# Patient Record
Sex: FEMALE | Race: WHITE | Employment: FULL TIME | URBAN - METROPOLITAN AREA
[De-identification: names, ages, dates, MRNs, and addresses within clinical notes are randomized per-mention and may not be internally consistent; named-entity substitution may affect disease eponyms.]

---

## 2021-09-30 ENCOUNTER — APPOINTMENT (OUTPATIENT)
Dept: ULTRASOUND IMAGING | Age: 26
End: 2021-09-30
Attending: EMERGENCY MEDICINE
Payer: COMMERCIAL

## 2021-09-30 ENCOUNTER — HOSPITAL ENCOUNTER (EMERGENCY)
Age: 26
Discharge: HOME | End: 2021-09-30
Attending: EMERGENCY MEDICINE
Payer: COMMERCIAL

## 2021-09-30 ENCOUNTER — APPOINTMENT (OUTPATIENT)
Dept: CT IMAGING | Age: 26
End: 2021-09-30
Attending: EMERGENCY MEDICINE
Payer: COMMERCIAL

## 2021-09-30 VITALS
OXYGEN SATURATION: 100 % | BODY MASS INDEX: 51.91 KG/M2 | HEIGHT: 63 IN | SYSTOLIC BLOOD PRESSURE: 146 MMHG | DIASTOLIC BLOOD PRESSURE: 95 MMHG | HEART RATE: 115 BPM | RESPIRATION RATE: 16 BRPM | TEMPERATURE: 98.3 F | WEIGHT: 293 LBS

## 2021-09-30 DIAGNOSIS — N83.202 CYST OF LEFT OVARY: Primary | ICD-10-CM

## 2021-09-30 DIAGNOSIS — N39.0 URINARY TRACT INFECTION WITHOUT HEMATURIA, SITE UNSPECIFIED: ICD-10-CM

## 2021-09-30 LAB
APPEARANCE UR: ABNORMAL
BACTERIA URNS QL MICRO: NEGATIVE /HPF
BILIRUB UR QL: NEGATIVE
COLOR UR: ABNORMAL
GLUCOSE UR STRIP.AUTO-MCNC: NEGATIVE MG/DL
HCG UR QL: NEGATIVE
HGB UR QL STRIP: ABNORMAL
KETONES UR QL STRIP.AUTO: NEGATIVE MG/DL
LEUKOCYTE ESTERASE UR QL STRIP.AUTO: ABNORMAL
MUCOUS THREADS URNS QL MICRO: ABNORMAL /LPF
NITRITE UR QL STRIP.AUTO: NEGATIVE
PH UR STRIP: 5 [PH] (ref 5–8)
PROT UR STRIP-MCNC: 30 MG/DL
RBC #/AREA URNS HPF: >100 /HPF (ref 0–5)
SP GR UR REFRACTOMETRY: 1.02 (ref 1–1.03)
UA: UC IF INDICATED,UAUC: ABNORMAL
UROBILINOGEN UR QL STRIP.AUTO: 0.1 EU/DL (ref 0.1–1)
WBC URNS QL MICRO: ABNORMAL /HPF (ref 0–4)

## 2021-09-30 PROCEDURE — 76856 US EXAM PELVIC COMPLETE: CPT

## 2021-09-30 PROCEDURE — 74176 CT ABD & PELVIS W/O CONTRAST: CPT

## 2021-09-30 PROCEDURE — 76830 TRANSVAGINAL US NON-OB: CPT

## 2021-09-30 PROCEDURE — 99283 EMERGENCY DEPT VISIT LOW MDM: CPT

## 2021-09-30 PROCEDURE — 81025 URINE PREGNANCY TEST: CPT

## 2021-09-30 PROCEDURE — 76857 US EXAM PELVIC LIMITED: CPT

## 2021-09-30 PROCEDURE — 87086 URINE CULTURE/COLONY COUNT: CPT

## 2021-09-30 PROCEDURE — 81001 URINALYSIS AUTO W/SCOPE: CPT

## 2021-09-30 RX ORDER — HYDROCODONE BITARTRATE AND ACETAMINOPHEN 5; 325 MG/1; MG/1
1 TABLET ORAL
Qty: 12 TABLET | Refills: 0 | Status: SHIPPED | OUTPATIENT
Start: 2021-09-30 | End: 2021-10-03

## 2021-09-30 RX ORDER — CEPHALEXIN 500 MG/1
500 CAPSULE ORAL 4 TIMES DAILY
Qty: 28 CAPSULE | Refills: 0 | Status: SHIPPED | OUTPATIENT
Start: 2021-09-30 | End: 2021-10-07

## 2021-09-30 NOTE — ED TRIAGE NOTES
\"Really bad abd pain to L side and troub le urinating which began on Friday.  States seen at [facility] who began treating for UTI but yesterday told her to stop treatment due thinks it may be ovarian cyst. States sx return last pm.

## 2021-09-30 NOTE — LETTER
[address] [facility] EMERGENCY DEPT  [place] [id number] [phone]    Work/School Note    Date: 9/30/2021    To Whom It May concern:    Israel Kumar was seen and treated today in the emergency room by the following provider(s):  Attending Provider: Erinn Staton MD.      Israel Kumar is excused from work/school on 09/30/21 and 10/01/21. She is medically clear to return to work/school on 10/2/2021.        Sincerely,        DR Estrellita Zambrano

## 2021-09-30 NOTE — DISCHARGE INSTRUCTIONS
Thank you! Thank you for allowing me to care for you in the emergency department. I sincerely hope that you are satisfied with your visit today. It is my goal to provide you with excellent care. Below you will find a list of your labs and imaging from your visit today. Should you have any questions regarding these results please do not hesitate to call the emergency department. Labs -     Recent Results (from the past 12 hour(s))   URINALYSIS W/ REFLEX CULTURE    Collection Time: 09/30/21  7:44 AM    Specimen: Urine   Result Value Ref Range    Color Yellow/Straw      Appearance Turbid (A) Clear      Specific gravity 1.016 1.003 - 1.030      pH (UA) 5.0 5.0 - 8.0      Protein 30 (A) Negative mg/dL    Glucose Negative Negative mg/dL    Ketone Negative Negative mg/dL    Bilirubin Negative Negative      Blood Large (A) Negative      Urobilinogen 0.1 0.1 - 1.0 EU/dL    Nitrites Negative Negative      Leukocyte Esterase Trace (A) Negative      UA:UC IF INDICATED Urine Culture Ordered (A) Culture not indicated by UA result      WBC 20-50 0 - 4 /hpf    RBC >100 (H) 0 - 5 /hpf    Bacteria Negative Negative /hpf    Mucus Trace /lpf   HCG URINE, QL    Collection Time: 09/30/21  8:00 AM   Result Value Ref Range    HCG urine, QL Negative Negative         Radiologic Studies -   CT ABD PELV WO CONT   Final Result   No bowel obstruction. No CT evidence for appendicitis or   diverticulitis. No hydronephrosis. No calcified renal or bladder stone. See recent pelvic ultrasound report. US TRANSVAGINAL   Final Result   1. Left adnexal mass likely arising from the left ovary or fallopian tube. Recommend six-week follow-up pelvic ultrasound to ensure resolution, and no   underlying neoplastic process. US PELV NON OBS   Final Result   1. Left adnexal mass likely arising from the left ovary or fallopian tube.    Recommend six-week follow-up pelvic ultrasound to ensure resolution, and no   underlying neoplastic process. CT Results  (Last 48 hours)                 09/30/21 1040  CT ABD PELV WO CONT Final result    Impression:  No bowel obstruction. No CT evidence for appendicitis or   diverticulitis. No hydronephrosis. No calcified renal or bladder stone. See recent pelvic ultrasound report. Narrative:  CT abdomen and pelvis without IV contrast.       Axial images are reviewed along with reformatted sagittal/coronal images. No IV   contrast administered. Dose reduction: All CT scans at this facility are performed using dose reduction   optimization techniques as appropriate to a performed exam including the   following-   automated exposure control, adjustments of mA and/or Kv according to patient   size, or use of iterative reconstructive technique. Lung bases are clear. Nonenhanced images demonstrate fatty change through the liver. Gallbladder   nondistended. Normal volume spleen. Pancreas and bilateral adrenal glands appear   unremarkable. Each kidney appears unremarkable on this nonenhanced study. No   calcific focus either kidney to suggest stone. No hydronephrosis. Stomach and small bowel loops are decompressed. Appendix unremarkable. There is   stool and air through colon. No CT evidence for appendicitis or diverticulitis. No ascites. Normal caliber abdominal aorta. Mildly distended bladder. No calcified bladder stone. Phleboliths lower pelvis. Prominent left ovary (limited characterization on this nonenhanced study). See   recent pelvic ultrasound report. CXR Results  (Last 48 hours)      None               If you feel that you have not received excellent quality care or timely care, please ask to speak to the nurse manager. Please choose us in the future for your continued health care needs.    ------------------------------------------------------------------------------------------------------------  The exam and treatment you received in the Emergency Department were for an urgent problem and are not intended as complete care. It is important that you follow-up with a doctor, nurse practitioner, or physician assistant to:  (1) confirm your diagnosis,  (2) re-evaluation of changes in your illness and treatment, and  (3) for ongoing care. If your symptoms become worse or you do not improve as expected and you are unable to reach your usual health care provider, you should return to the Emergency Department. We are available 24 hours a day. Please take your discharge instructions with you when you go to your follow-up appointment. If you have any problem arranging a follow-up appointment, contact the Emergency Department immediately. If a prescription has been provided, please have it filled as soon as possible to prevent a delay in treatment. Read the entire medication instruction sheet provided to you by the pharmacy. If you have any questions or reservations about taking the medication due to side effects or interactions with other medications, please call your primary care physician or contact the ER to speak with the charge nurse. Make an appointment with your family doctor or the physician you were referred to for follow-up of this visit as instructed on your discharge paperwork, as this is a mandatory follow-up. Return to the ER if you are unable to be seen or if you are unable to be seen in a timely manner. If you have any problem arranging the follow-up visit, contact the Emergency Department immediately.

## 2021-09-30 NOTE — ED PROVIDER NOTES
EMERGENCY DEPARTMENT HISTORY AND PHYSICAL EXAM      Date: 9/30/2021  Patient Name: Kerry Irby    History of Presenting Illness     Chief Complaint   Patient presents with    Abdominal Pain       History Provided By: Patient    HPI: Kerry Irby, 27 y.o. female with a past medical history significant No significant past medical history presents to the ED with chief complaint of Abdominal Pain  . 70-year-old female denies pregnancy. Patient is complaining of left lower quadrant abdominal pain. [facility] told she had a [facility]TI then told she did not. Is a culture grew out negative. She was advised to stop taking her antibiotic. Patient has had a history of ovarian cyst on the right side with subsequent oophorectomy. She is concerned that she may have a cyst on the left side it is does feel similar. Pain did improve but intensified this morning. Severe pain. Now mild pain. Left lower quadrant. No fevers. No hematuria or dysuria. No fevers. No nausea vomiting. Declining pain medicine. Pain present for 3 days. There are no other complaints, changes, or physical findings at this time. PCP: ELEN Lorenz    Current Outpatient Medications   Medication Sig Dispense Refill    cephALEXin (Keflex) 500 mg capsule Take 1 Capsule by mouth four (4) times daily for 7 days. 28 Capsule 0    HYDROcodone-acetaminophen (Norco) 5-325 mg per tablet Take 1 Tablet by mouth every six (6) hours as needed for Pain for up to 3 days. Max Daily Amount: 4 Tablets. 12 Tablet 0       Past History     Past Medical History:  Denies  Past Surgical History: oopherectomy    Family History:  Denies      Social History: denies  Social History     Tobacco Use    Smoking status: Not on file   Substance Use Topics    Alcohol use: Not on file    Drug use: Not on file       Allergies:  No Known Allergies      Review of Systems   Review of Systems   Constitutional: Negative. Negative for chills, fatigue and fever. HENT: Negative. Negative for congestion, nosebleeds and sore throat. Eyes: Negative. Negative for pain, discharge and visual disturbance. Respiratory: Negative. Negative for cough, chest tightness and shortness of breath. Cardiovascular: Negative for chest pain, palpitations and leg swelling. Gastrointestinal: Positive for abdominal pain. Negative for blood in stool, constipation, diarrhea, nausea and vomiting. Endocrine: Negative. Genitourinary: Negative. Negative for difficulty urinating, dysuria, pelvic pain and vaginal bleeding. Musculoskeletal: Negative. Negative for arthralgias, back pain and myalgias. Skin: Negative. Negative for rash and wound. Allergic/Immunologic: Negative. Neurological: Negative. Negative for dizziness, syncope, weakness, numbness and headaches. Hematological: Negative. Psychiatric/Behavioral: Negative. Negative for agitation, confusion and suicidal ideas. All other systems reviewed and are negative. Physical Exam   Physical Exam  Vitals and nursing note reviewed. Exam conducted with a chaperone present. Constitutional:       Appearance: Normal appearance. She is normal weight. HENT:      Head: Normocephalic and atraumatic. Nose: Nose normal.      Mouth/Throat:      Mouth: Mucous membranes are moist.      Pharynx: Oropharynx is clear. Eyes:      Extraocular Movements: Extraocular movements intact. Conjunctiva/sclera: Conjunctivae normal.      Pupils: Pupils are equal, round, and reactive to light. Cardiovascular:      Rate and Rhythm: Normal rate and regular rhythm. Pulses: Normal pulses. Heart sounds: Normal heart sounds. Pulmonary:      Effort: Pulmonary effort is normal. No respiratory distress. Breath sounds: Normal breath sounds. Abdominal:      General: Abdomen is flat. Bowel sounds are normal. There is no distension. Palpations: Abdomen is soft. Tenderness: There is abdominal tenderness in the left lower quadrant. There is no guarding. Musculoskeletal:         General: No swelling, tenderness, deformity or signs of injury. Normal range of motion. Cervical back: Normal range of motion and neck supple. Right lower leg: No edema. Left lower leg: No edema. Skin:     General: Skin is warm and dry. Capillary Refill: Capillary refill takes less than 2 seconds. Findings: No lesion or rash. Neurological:      General: No focal deficit present. Mental Status: She is alert and oriented to person, place, and time. Mental status is at baseline. Cranial Nerves: No cranial nerve deficit. Psychiatric:         Mood and Affect: Mood normal.         Behavior: Behavior normal.         Thought Content: Thought content normal.         Judgment: Judgment normal.         Diagnostic Study Results     Labs -     Recent Results (from the past 12 hour(s))   URINALYSIS W/ REFLEX CULTURE    Collection Time: 09/30/21  7:44 AM    Specimen: Urine   Result Value Ref Range    Color Yellow/Straw      Appearance Turbid (A) Clear      Specific gravity 1.016 1.003 - 1.030      pH (UA) 5.0 5.0 - 8.0      Protein 30 (A) Negative mg/dL    Glucose Negative Negative mg/dL    Ketone Negative Negative mg/dL    Bilirubin Negative Negative      Blood Large (A) Negative      Urobilinogen 0.1 0.1 - 1.0 EU/dL    Nitrites Negative Negative      Leukocyte Esterase Trace (A) Negative      UA:UC IF INDICATED Urine Culture Ordered (A) Culture not indicated by UA result      WBC 20-50 0 - 4 /hpf    RBC >100 (H) 0 - 5 /hpf    Bacteria Negative Negative /hpf    Mucus Trace /lpf   HCG URINE, QL    Collection Time: 09/30/21  8:00 AM   Result Value Ref Range    HCG urine, QL Negative Negative         Radiologic Studies -   CT ABD PELV WO CONT   Final Result   No bowel obstruction. No CT evidence for appendicitis or   diverticulitis. No hydronephrosis.  No calcified renal or bladder stone. See recent pelvic ultrasound report. US TRANSVAGINAL   Final Result   1. Left adnexal mass likely arising from the left ovary or fallopian tube. Recommend six-week follow-up pelvic ultrasound to ensure resolution, and no   underlying neoplastic process. US PELV NON OBS   Final Result   1. Left adnexal mass likely arising from the left ovary or fallopian tube. Recommend six-week follow-up pelvic ultrasound to ensure resolution, and no   underlying neoplastic process. CT Results  (Last 48 hours)               09/30/21 1040  CT ABD PELV WO CONT Final result    Impression:  No bowel obstruction. No CT evidence for appendicitis or   diverticulitis. No hydronephrosis. No calcified renal or bladder stone. See recent pelvic ultrasound report. Narrative:  CT abdomen and pelvis without IV contrast.       Axial images are reviewed along with reformatted sagittal/coronal images. No IV   contrast administered. Dose reduction: All CT scans at this facility are performed using dose reduction   optimization techniques as appropriate to a performed exam including the   following-   automated exposure control, adjustments of mA and/or Kv according to patient   size, or use of iterative reconstructive technique. Lung bases are clear. Nonenhanced images demonstrate fatty change through the liver. Gallbladder   nondistended. Normal volume spleen. Pancreas and bilateral adrenal glands appear   unremarkable. Each kidney appears unremarkable on this nonenhanced study. No   calcific focus either kidney to suggest stone. No hydronephrosis. Stomach and small bowel loops are decompressed. Appendix unremarkable. There is   stool and air through colon. No CT evidence for appendicitis or diverticulitis. No ascites. Normal caliber abdominal aorta. Mildly distended bladder. No calcified bladder stone. Phleboliths lower pelvis. Prominent left ovary (limited characterization on this nonenhanced study). See   recent pelvic ultrasound report. CXR Results  (Last 48 hours)    None          Medical Decision Making and ED Course   I am the first provider for this patient. I reviewed the vital signs, available nursing notes, past medical history, past surgical history, family history and social history. Vital Signs-Reviewed the patient's vital signs. Patient Vitals for the past 12 hrs:   Temp Pulse Resp BP SpO2   09/30/21 0734 98.3 °F (36.8 °C) (!) 115 16 (!) 146/95 100 %       EKG interpretation:         Records Reviewed: Previous Hospital chart. EMS run report      ED Course:   Initial assessment performed. The patients presenting problems have been discussed, and they are in agreement with the care plan formulated and outlined with them. I have encouraged them to ask questions as they arise throughout their visit. Orders Placed This Encounter    CULTURE, URINE     Standing Status:   Standing     Number of Occurrences:   1    US TRANSVAGINAL     Standing Status:   Standing     Number of Occurrences:   1     Order Specific Question:   Transport     Answer:   BED [2]     Order Specific Question:   Reason for Exam     Answer:   llq pain     Order Specific Question:   Is Patient Pregnant? Answer:   Unknown    US PELV NON OBS     Standing Status:   Standing     Number of Occurrences:   1     Order Specific Question:   Transport     Answer:   BED [2]     Order Specific Question:   Reason for Exam     Answer:   LLQ pain, 1 ovary on L     Order Specific Question:   Is Patient Pregnant? Answer:   Unknown    CT ABD PELV WO CONT     Standing Status:   Standing     Number of Occurrences:   1     Order Specific Question:   Transport     Answer:   BED [2]     Order Specific Question:   Is Patient Pregnant?      Answer:   Unknown     Order Specific Question:   Reason for Exam     Answer:   llq pain     Order Specific Question:   Type of contrast.  PLEASE NOTE: IV contrast is NOT utilized with this order. Answer:   None     Order Specific Question:   Decision Support Exception     Answer:   Emergency Medical Condition (MA) [1]    URINALYSIS W/ REFLEX CULTURE     Standing Status:   Standing     Number of Occurrences:   1    HCG URINE, QL     Standing Status:   Standing     Number of Occurrences:   1    cephALEXin (Keflex) 500 mg capsule     Sig: Take 1 Capsule by mouth four (4) times daily for 7 days. Dispense:  28 Capsule     Refill:  0    HYDROcodone-acetaminophen (Norco) 5-325 mg per tablet     Sig: Take 1 Tablet by mouth every six (6) hours as needed for Pain for up to 3 days. Max Daily Amount: 4 Tablets. Dispense:  12 Tablet     Refill:  0              CONSULTANTS:  Consults      Provider Notes (Medical Decision Making):   78-year-old female with some hematuria concern for UTI hemorrhagic versus menses versus kidney stone versus ovarian cyst versus diverticulitis with left lower quadrant abdominal pain. Ultrasound CAT scan do confirm an ovarian cyst without evidence of torsion. Patient's urine is equivocal patient would prefer to try a course of antibiotics in addition to pain control and follow-up with [facility]. Vitals are stable pain controlled at discharge. Procedures                       Disposition       Emergency Department Disposition:  Discharged         DISCHARGE PLAN:    Patient is discharged home. Discharge instructions provided. Patient is stable and improved at time of disposition. Vitals are stable. 1. Cannot display discharge medications since this patient is not currently admitted.     2.   Follow-up Information     Follow up With Specialties Details Why [address] [facility], [facility] Physician Assistant   [address] [address] [address] [phone]      [facility]   [address] [place] [id number] [phone] 3.  Return to ED if worse     Pt voiced they understand they plan and do not have questions at this time        Diagnosis     Clinical Impression:   1. Cyst of left ovary    2. Urinary tract infection without hematuria, site unspecified        Attestations:    Brian Lugo MD    Please note that this dictation was completed with [employer], the computer voice recognition software. Quite often unanticipated grammatical, syntax, homophones, and other interpretive errors are inadvertently transcribed by the computer software. Please disregard these errors. Please excuse any errors that have escaped final proofreading. Thank you.

## 2021-10-01 LAB
BACTERIA SPEC CULT: NORMAL
COLONY COUNT,CNT: 5000
COLONY COUNT,CNT: NORMAL
SPECIAL REQUESTS,SREQ: NORMAL

## 2021-10-17 ENCOUNTER — HOSPITAL ENCOUNTER (EMERGENCY)
Age: 26
Discharge: HOME | End: 2021-10-17
Attending: STUDENT IN AN ORGANIZED HEALTH CARE EDUCATION/TRAINING PROGRAM
Payer: COMMERCIAL

## 2021-10-17 ENCOUNTER — APPOINTMENT (OUTPATIENT)
Dept: ULTRASOUND IMAGING | Age: 26
End: 2021-10-17
Attending: STUDENT IN AN ORGANIZED HEALTH CARE EDUCATION/TRAINING PROGRAM
Payer: COMMERCIAL

## 2021-10-17 VITALS
OXYGEN SATURATION: 98 % | TEMPERATURE: 97.9 F | SYSTOLIC BLOOD PRESSURE: 160 MMHG | RESPIRATION RATE: 18 BRPM | BODY MASS INDEX: 51.91 KG/M2 | DIASTOLIC BLOOD PRESSURE: 89 MMHG | WEIGHT: 293 LBS | HEART RATE: 95 BPM | HEIGHT: 63 IN

## 2021-10-17 DIAGNOSIS — R10.2 PELVIC PAIN: Primary | ICD-10-CM

## 2021-10-17 DIAGNOSIS — R03.0 ELEVATED BLOOD PRESSURE READING: ICD-10-CM

## 2021-10-17 LAB
ANION GAP SERPL CALC-SCNC: 8 MMOL/L (ref 5–15)
APPEARANCE UR: ABNORMAL
BACTERIA URNS QL MICRO: NEGATIVE /HPF
BASOPHILS # BLD: 0.1 K/UL (ref 0–0.1)
BASOPHILS NFR BLD: 0 % (ref 0–1)
BILIRUB UR QL: NEGATIVE
BUN SERPL-MCNC: 10 MG/DL (ref 6–20)
BUN/CREAT SERPL: 11 (ref 12–20)
CA-I BLD-MCNC: 9.4 MG/DL (ref 8.5–10.1)
CHLORIDE SERPL-SCNC: 105 MMOL/L (ref 97–108)
CO2 SERPL-SCNC: 25 MMOL/L (ref 21–32)
COLOR UR: ABNORMAL
CREAT SERPL-MCNC: 0.87 MG/DL (ref 0.55–1.02)
DIFFERENTIAL METHOD BLD: ABNORMAL
EOSINOPHIL # BLD: 0 K/UL (ref 0–0.4)
EOSINOPHIL NFR BLD: 0 % (ref 0–7)
ERYTHROCYTE [DISTWIDTH] IN BLOOD BY AUTOMATED COUNT: 16.2 % (ref 11.5–14.5)
GLUCOSE SERPL-MCNC: 101 MG/DL (ref 65–100)
GLUCOSE UR STRIP.AUTO-MCNC: NEGATIVE MG/DL
HCG SERPL QL: NEGATIVE
HCT VFR BLD AUTO: 37.6 % (ref 35–47)
HGB BLD-MCNC: 12 G/DL (ref 11.5–16)
HGB UR QL STRIP: NEGATIVE
IMM GRANULOCYTES # BLD AUTO: 0.1 K/UL (ref 0–0.04)
IMM GRANULOCYTES NFR BLD AUTO: 0 % (ref 0–0.5)
KETONES UR QL STRIP.AUTO: 5 MG/DL
KOH PREP SPEC: NORMAL
LEUKOCYTE ESTERASE UR QL STRIP.AUTO: ABNORMAL
LYMPHOCYTES # BLD: 2.4 K/UL (ref 0.8–3.5)
LYMPHOCYTES NFR BLD: 16 % (ref 12–49)
MCH RBC QN AUTO: 24 PG (ref 26–34)
MCHC RBC AUTO-ENTMCNC: 31.9 G/DL (ref 30–36.5)
MCV RBC AUTO: 75.4 FL (ref 80–99)
MONOCYTES # BLD: 0.9 K/UL (ref 0–1)
MONOCYTES NFR BLD: 6 % (ref 5–13)
MUCOUS THREADS URNS QL MICRO: ABNORMAL /LPF
NEUTS SEG # BLD: 11.5 K/UL (ref 1.8–8)
NEUTS SEG NFR BLD: 78 % (ref 32–75)
NITRITE UR QL STRIP.AUTO: NEGATIVE
NRBC # BLD: 0 K/UL (ref 0–0.01)
NRBC BLD-RTO: 0 PER 100 WBC
PH UR STRIP: 5 [PH] (ref 5–8)
PLATELET # BLD AUTO: 459 K/UL (ref 150–400)
PMV BLD AUTO: 9.8 FL (ref 8.9–12.9)
POTASSIUM SERPL-SCNC: 4.2 MMOL/L (ref 3.5–5.1)
PROT UR STRIP-MCNC: 30 MG/DL
RBC # BLD AUTO: 4.99 M/UL (ref 3.8–5.2)
RBC #/AREA URNS HPF: ABNORMAL /HPF (ref 0–5)
SODIUM SERPL-SCNC: 138 MMOL/L (ref 136–145)
SP GR UR REFRACTOMETRY: 1.02 (ref 1–1.03)
SPECIAL REQUESTS,SREQ: NORMAL
TRICHOMONAS RAPID AG, TRICR: NEGATIVE
UA: UC IF INDICATED,UAUC: ABNORMAL
UROBILINOGEN UR QL STRIP.AUTO: 0.1 EU/DL (ref 0.1–1)
WBC # BLD AUTO: 14.9 K/UL (ref 3.6–11)
WBC URNS QL MICRO: ABNORMAL /HPF (ref 0–4)

## 2021-10-17 PROCEDURE — 76856 US EXAM PELVIC COMPLETE: CPT

## 2021-10-17 PROCEDURE — 84703 CHORIONIC GONADOTROPIN ASSAY: CPT

## 2021-10-17 PROCEDURE — 36415 COLL VENOUS BLD VENIPUNCTURE: CPT

## 2021-10-17 PROCEDURE — 80048 BASIC METABOLIC PNL TOTAL CA: CPT

## 2021-10-17 PROCEDURE — 81001 URINALYSIS AUTO W/SCOPE: CPT

## 2021-10-17 PROCEDURE — 87491 CHLMYD TRACH DNA AMP PROBE: CPT

## 2021-10-17 PROCEDURE — 99283 EMERGENCY DEPT VISIT LOW MDM: CPT

## 2021-10-17 PROCEDURE — 87210 SMEAR WET MOUNT SALINE/INK: CPT

## 2021-10-17 PROCEDURE — 96374 THER/PROPH/DIAG INJ IV PUSH: CPT

## 2021-10-17 PROCEDURE — 85025 COMPLETE CBC W/AUTO DIFF WBC: CPT

## 2021-10-17 PROCEDURE — 87808 TRICHOMONAS ASSAY W/OPTIC: CPT

## 2021-10-17 PROCEDURE — 74011250636 HC RX REV CODE- 250/636: Performed by: STUDENT IN AN ORGANIZED HEALTH CARE EDUCATION/TRAINING PROGRAM

## 2021-10-17 RX ORDER — MORPHINE SULFATE 4 MG/ML
4 INJECTION INTRAVENOUS ONCE
Status: COMPLETED | OUTPATIENT
Start: 2021-10-17 | End: 2021-10-17

## 2021-10-17 RX ORDER — ACETAMINOPHEN 500 MG
1000 TABLET ORAL
Qty: 40 TABLET | Refills: 0 | Status: SHIPPED | OUTPATIENT
Start: 2021-10-17 | End: 2021-11-26

## 2021-10-17 RX ORDER — IBUPROFEN 400 MG/1
400 TABLET ORAL
Qty: 20 TABLET | Refills: 0 | Status: SHIPPED | OUTPATIENT
Start: 2021-10-17 | End: 2022-02-18 | Stop reason: ALTCHOICE

## 2021-10-17 RX ORDER — SODIUM CHLORIDE 9 MG/ML
500 INJECTION, SOLUTION INTRAVENOUS CONTINUOUS
Status: DISCONTINUED | OUTPATIENT
Start: 2021-10-17 | End: 2021-10-17 | Stop reason: HOSPADM

## 2021-10-17 RX ORDER — ONDANSETRON 4 MG/1
4 TABLET, ORALLY DISINTEGRATING ORAL
Qty: 10 TABLET | Refills: 0 | Status: SHIPPED | OUTPATIENT
Start: 2021-10-17 | End: 2022-02-18

## 2021-10-17 RX ADMIN — SODIUM CHLORIDE 500 ML: 9 INJECTION, SOLUTION INTRAVENOUS at 13:53

## 2021-10-17 RX ADMIN — MORPHINE SULFATE 4 MG: 4 INJECTION INTRAVENOUS at 13:53

## 2021-10-17 NOTE — ED NOTES
Pt verbalized understanding of discharge instructions. IV removed. Pt alert and self ambulatory on discharge.

## 2021-10-17 NOTE — ED TRIAGE NOTES
Pt recently Dx with ovarian cyst on 9/30/21. Lower abd pain since this morning with vomiting. Wants to ensure that cyst has no ruptured.

## 2021-10-17 NOTE — ED PROVIDER NOTES
EMERGENCY DEPARTMENT HISTORY AND PHYSICAL EXAM      Date: 10/17/2021  Patient Name: Harry Velasco      History of Presenting Illness     Chief Complaint   Patient presents with    Ovarian Cyst    Abdominal Pain       History Provided By: Patient    HPI: Harry Velasco, 27 y.o. female with PMH of obesity and surgically removed right ovary due to torsion presenting with acute onset of abdominal pain. Abdominal pain is in the left lower quadrant started this morning, dull, associated with nausea and vomited couple of times. Patient is worried that she has a cyst on the left ovary and afraid that she is in a lose her left ovary. Had similar episodes of pain in the past. There is no associated fever, night sweats or chills. Of note the patient is sexually active and has not been evaluated for STDs in 2 years. Currently denying vaginal bleeding and vaginal discharge. LMP 9/30/2021. There are no other complaints, changes, or physical findings at this time. PCP: ELEN May    Current Outpatient Medications   Medication Sig Dispense Refill    acetaminophen (TYLENOL) 500 mg tablet Take 2 Tablets by mouth every six (6) hours as needed for Pain for up to 40 days. 40 Tablet 0    ibuprofen (MOTRIN) 400 mg tablet Take 1 Tablet by mouth every six (6) hours as needed for Pain. 20 Tablet 0    ondansetron (Zofran ODT) 4 mg disintegrating tablet 1 Tablet by SubLINGual route every eight (8) hours as needed for Nausea or Vomiting. 10 Tablet 0       Past History     Past Medical History:  History reviewed. No pertinent past medical history. Past Surgical History:  History reviewed. No pertinent surgical history. Family History:  History reviewed. No pertinent family history.     Social History:  Social History     Tobacco Use    Smoking status: Never Smoker    Smokeless tobacco: Never Used   Substance Use Topics    Alcohol use: Not on file    Drug use: Not on file       Allergies:  No Known Allergies      Review of Systems     Review of Systems   Constitutional: Negative for appetite change and fever. HENT: Negative for rhinorrhea and trouble swallowing. Eyes: Negative for photophobia and visual disturbance. Respiratory: Negative for cough and shortness of breath. Cardiovascular: Negative for chest pain and leg swelling. Gastrointestinal: Positive for nausea and vomiting. Negative for constipation and diarrhea. Genitourinary: Positive for pelvic pain. Negative for dysuria. Musculoskeletal: Negative for back pain and gait problem. Skin: Negative for color change and rash. Neurological: Negative for dizziness and weakness. Physical Exam     Physical Exam  Vitals and nursing note reviewed. Constitutional:       General: She is not in acute distress. Appearance: Normal appearance. She is obese. She is not ill-appearing, toxic-appearing or diaphoretic. HENT:      Head: Normocephalic and atraumatic. Mouth/Throat:      Mouth: Mucous membranes are moist.      Pharynx: Oropharynx is clear. Eyes:      Extraocular Movements: Extraocular movements intact. Conjunctiva/sclera: Conjunctivae normal.   Cardiovascular:      Rate and Rhythm: Normal rate and regular rhythm. Pulmonary:      Effort: Pulmonary effort is normal.      Breath sounds: Normal breath sounds. Abdominal:      General: Abdomen is flat. Palpations: Abdomen is soft. Tenderness: There is abdominal tenderness in the suprapubic area. Musculoskeletal:         General: No tenderness or deformity. Cervical back: Normal range of motion and neck supple. Skin:     General: Skin is warm and dry. Neurological:      General: No focal deficit present. Mental Status: She is alert and oriented to person, place, and time.          Lab and Diagnostic Study Results     Labs -     Recent Results (from the past 12 hour(s))   DINORAH, OTHER SOURCES    Collection Time: 10/17/21  1:30 PM    Specimen: Vagina; Other   Result Value Ref Range    Special Requests: No Special Requests      KOH No fungal elements seen     CBC WITH AUTOMATED DIFF    Collection Time: 10/17/21  1:45 PM   Result Value Ref Range    WBC 14.9 (H) 3.6 - 11.0 K/uL    RBC 4.99 3.80 - 5.20 M/uL    HGB 12.0 11.5 - 16.0 g/dL    HCT 37.6 35.0 - 47.0 %    MCV 75.4 (L) 80.0 - 99.0 FL    MCH 24.0 (L) 26.0 - 34.0 PG    MCHC 31.9 30.0 - 36.5 g/dL    RDW 16.2 (H) 11.5 - 14.5 %    PLATELET 766 (H) 006 - 400 K/uL    MPV 9.8 8.9 - 12.9 FL    NRBC 0.0 0.0  WBC    ABSOLUTE NRBC 0.00 0.00 - 0.01 K/uL    NEUTROPHILS 78 (H) 32 - 75 %    LYMPHOCYTES 16 12 - 49 %    MONOCYTES 6 5 - 13 %    EOSINOPHILS 0 0 - 7 %    BASOPHILS 0 0 - 1 %    IMMATURE GRANULOCYTES 0 0 - 0.5 %    ABS. NEUTROPHILS 11.5 (H) 1.8 - 8.0 K/UL    ABS. LYMPHOCYTES 2.4 0.8 - 3.5 K/UL    ABS. MONOCYTES 0.9 0.0 - 1.0 K/UL    ABS. EOSINOPHILS 0.0 0.0 - 0.4 K/UL    ABS. BASOPHILS 0.1 0.0 - 0.1 K/UL    ABS. IMM.  GRANS. 0.1 (H) 0.00 - 0.04 K/UL    DF AUTOMATED     METABOLIC PANEL, BASIC    Collection Time: 10/17/21  1:45 PM   Result Value Ref Range    Sodium 138 136 - 145 mmol/L    Potassium 4.2 3.5 - 5.1 mmol/L    Chloride 105 97 - 108 mmol/L    CO2 25 21 - 32 mmol/L    Anion gap 8 5 - 15 mmol/L    Glucose 101 (H) 65 - 100 mg/dL    BUN 10 6 - 20 mg/dL    Creatinine 0.87 0.55 - 1.02 mg/dL    BUN/Creatinine ratio 11 (L) 12 - 20      GFR est AA >60 >60 ml/min/1.73m2    GFR est non-AA >60 >60 ml/min/1.73m2    Calcium 9.4 8.5 - 10.1 mg/dL   HCG QL SERUM    Collection Time: 10/17/21  1:45 PM   Result Value Ref Range    HCG, Ql. Negative Negative     URINALYSIS W/ REFLEX CULTURE    Collection Time: 10/17/21  1:45 PM    Specimen: Urine   Result Value Ref Range    Color Yellow/Straw      Appearance Turbid (A) Clear      Specific gravity 1.023 1.003 - 1.030      pH (UA) 5.0 5.0 - 8.0      Protein 30 (A) Negative mg/dL    Glucose Negative Negative mg/dL    Ketone 5 (A) Negative mg/dL    Bilirubin Negative Negative      Blood Negative Negative      Urobilinogen 0.1 0.1 - 1.0 EU/dL    Nitrites Negative Negative      Leukocyte Esterase Trace (A) Negative      UA:UC IF INDICATED Culture not indicated by UA result Culture not indicated by UA result      WBC 5-10 0 - 4 /hpf    RBC 5-10 0 - 5 /hpf    Bacteria Negative Negative /hpf    Mucus Trace /lpf   TRICHOMONAS RAPID AG    Collection Time: 10/17/21  1:52 PM   Result Value Ref Range    Trichomonas, rapid Ag Negative Negative         Radiologic Studies -   [unfilled]  CT Results  (Last 48 hours)    None        CXR Results  (Last 48 hours)    None          Medical Decision Making and ED Course   - I am the first and primary provider for this patient AND AM THE PRIMARY PROVIDER OF RECORD. - I reviewed the vital signs, available nursing notes, past medical history, past surgical history, family history and social history. - Initial assessment performed. The patients presenting problems have been discussed, and the staff are in agreement with the care plan formulated and outlined with them. I have encouraged them to ask questions as they arise throughout their visit. Vital Signs-Reviewed the patient's vital signs. Patient Vitals for the past 12 hrs:   Temp Pulse Resp BP SpO2   10/17/21 1700 -- 95 -- -- --   10/17/21 1526 -- (!) 109 18 (!) 160/89 98 %   10/17/21 1305 97.9 °F (36.6 °C) (!) 107 20 (!) 164/84 99 %         Records Reviewed: Nursing Notes and Old Medical Records    Provider Notes (Medical Decision Making):   Patient presenting with pelvic pain. Differential diagnosis includes UTI, PID, tubo-ovarian abscess, or ovarian torsion. Patient does have history of oophorectomy in the right and has currently 1 ovary in the left. Thus, will obtain CBC, chemistry, urinalysis, pregnancy test, STI screening, and transvaginal ultrasound. ED Course: The patient presents with abdominal pain of uncertain etiology.  Based on the patient's clinical picture at this time and the workup as performed above, I see no evidence for more malignant underlying processes. Analysis of patient's electrolytes reveals no clinically significant abnormalities that contribute to the patient's presenting complaint. There is no significant evidence for dehydration. There is no evidence for renal dysfunction/abnormality at this time. Urinalysis is negative for signs of infection or dehydration. Urine pregnancy test was negative. Vaginal US is negative. The possibility of more malignant occult pathology was discussed in depth with the patient. The patient understands the need to return promptly with any worsening symptoms or changes, and that no emergency department workup, no matter how extensive, can definitely exclude some more serious intra-abdominal and pelvic processes early in the disease course. After completion of workup and discussion of results and diagnoses with the patient, all the patient's questions were answered. If required, all follow up appointments and treatments were discussed and explained. The patient sounded understanding and agrees with the plan. The patient understands that at this time there is no evidence for a more malignant underlying process, but the patient also understands that early in the process of an illness, an emergency department workup can be falsely reassuring. Routine discharge counseling was given to the patient and the patient understands that worsening, changing or persistent symptoms should prompt an immediate call or follow up with their primary physician or the emergency department. The importance of appropriate follow up was also discussed with the patient. More extensive discharge instructions were given in the patient's discharge paperwork. Disposition     Disposition: Condition improved  DC- Adult Discharges: All of the diagnostic tests were reviewed and questions answered.  Diagnosis, care plan and treatment options were discussed. The patient understands the instructions and will follow up as directed. The patients results have been reviewed with them. They have been counseled regarding their diagnosis. The patient verbally convey understanding and agreement of the signs, symptoms, diagnosis, treatment and prognosis and additionally agrees to follow up as recommended with their PCP in 24 - 48 hours. They also agree with the care-plan and convey that all of their questions have been answered. I have also put together some discharge instructions for them that include: 1) educational information regarding their diagnosis, 2) how to care for their diagnosis at home, as well a 3) list of reasons why they would want to return to the ED prior to their follow-up appointment, should their condition change. Discharged      DISCHARGE PLAN:  1. There are no discharge medications for this patient. 2.   Follow-up Information     Follow up With Specialties Details Why [address] [place], [place] Physician Assistant Schedule an appointment as soon as possible for a visit in 3 days For reevaluation, Discuss your visit to the [address] [place] [address]  [phone]      [facility] EMERGENCY DEPT Emergency Medicine  As needed, If symptoms worsen [address] [facility] [place] [address] [phone]        3. Return to ED if worse   4. Discharge Medication List as of 10/17/2021  4:24 PM      START taking these medications    Details   acetaminophen (TYLENOL) 500 mg tablet Take 2 Tablets by mouth every six (6) hours as needed for Pain for up to 40 days. , Normal, Disp-40 Tablet, R-0      ibuprofen (MOTRIN) 400 mg tablet Take 1 Tablet by mouth every six (6) hours as needed for Pain., Normal, Disp-20 Tablet, R-0      ondansetron (Zofran ODT) 4 mg disintegrating tablet 1 Tablet by SubLINGual route every eight (8) hours as needed for Nausea or Vomiting., Normal, Disp-10 Tablet, R-0 Diagnosis     Clinical Impression:   1. Pelvic pain    2. Elevated blood pressure reading        Attestations: Wolfgang Wei MD    Please note that this dictation was completed with [employer], the computer voice recognition software. Quite often unanticipated grammatical, syntax, homophones, and other interpretive errors are inadvertently transcribed by the computer software. Please disregard these errors. Please excuse any errors that have escaped final proofreading. Thank you.

## 2021-10-17 NOTE — DISCHARGE INSTRUCTIONS
Thank you! Thank you for allowing me to care for you in the emergency department. I sincerely hope that you are satisfied with your visit today. It is my goal to provide you with excellent care. Below you will find a list of your labs and imaging from your visit today. Should you have any questions regarding these results please do not hesitate to call the emergency department. Labs -     Recent Results (from the past 12 hour(s))   DINORAH, OTHER SOURCES    Collection Time: 10/17/21  1:30 PM    Specimen: Vagina; Other   Result Value Ref Range    Special Requests: No Special Requests      KOH No fungal elements seen     CBC WITH AUTOMATED DIFF    Collection Time: 10/17/21  1:45 PM   Result Value Ref Range    WBC 14.9 (H) 3.6 - 11.0 K/uL    RBC 4.99 3.80 - 5.20 M/uL    HGB 12.0 11.5 - 16.0 g/dL    HCT 37.6 35.0 - 47.0 %    MCV 75.4 (L) 80.0 - 99.0 FL    MCH 24.0 (L) 26.0 - 34.0 PG    MCHC 31.9 30.0 - 36.5 g/dL    RDW 16.2 (H) 11.5 - 14.5 %    PLATELET 882 (H) 623 - 400 K/uL    MPV 9.8 8.9 - 12.9 FL    NRBC 0.0 0.0  WBC    ABSOLUTE NRBC 0.00 0.00 - 0.01 K/uL    NEUTROPHILS 78 (H) 32 - 75 %    LYMPHOCYTES 16 12 - 49 %    MONOCYTES 6 5 - 13 %    EOSINOPHILS 0 0 - 7 %    BASOPHILS 0 0 - 1 %    IMMATURE GRANULOCYTES 0 0 - 0.5 %    ABS. NEUTROPHILS 11.5 (H) 1.8 - 8.0 K/UL    ABS. LYMPHOCYTES 2.4 0.8 - 3.5 K/UL    ABS. MONOCYTES 0.9 0.0 - 1.0 K/UL    ABS. EOSINOPHILS 0.0 0.0 - 0.4 K/UL    ABS. BASOPHILS 0.1 0.0 - 0.1 K/UL    ABS. IMM.  GRANS. 0.1 (H) 0.00 - 0.04 K/UL    DF AUTOMATED     METABOLIC PANEL, BASIC    Collection Time: 10/17/21  1:45 PM   Result Value Ref Range    Sodium 138 136 - 145 mmol/L    Potassium 4.2 3.5 - 5.1 mmol/L    Chloride 105 97 - 108 mmol/L    CO2 25 21 - 32 mmol/L    Anion gap 8 5 - 15 mmol/L    Glucose 101 (H) 65 - 100 mg/dL    BUN 10 6 - 20 mg/dL    Creatinine 0.87 0.55 - 1.02 mg/dL    BUN/Creatinine ratio 11 (L) 12 - 20      GFR est AA >60 >60 ml/min/1.73m2    GFR est non-AA >60 >60 ml/min/1.73m2    Calcium 9.4 8.5 - 10.1 mg/dL   HCG QL SERUM    Collection Time: 10/17/21  1:45 PM   Result Value Ref Range    HCG, Ql. Negative Negative     URINALYSIS W/ REFLEX CULTURE    Collection Time: 10/17/21  1:45 PM    Specimen: Urine   Result Value Ref Range    Color Yellow/Straw      Appearance Turbid (A) Clear      Specific gravity 1.023 1.003 - 1.030      pH (UA) 5.0 5.0 - 8.0      Protein 30 (A) Negative mg/dL    Glucose Negative Negative mg/dL    Ketone 5 (A) Negative mg/dL    Bilirubin Negative Negative      Blood Negative Negative      Urobilinogen 0.1 0.1 - 1.0 EU/dL    Nitrites Negative Negative      Leukocyte Esterase Trace (A) Negative      UA:UC IF INDICATED Culture not indicated by UA result Culture not indicated by UA result      WBC 5-10 0 - 4 /hpf    RBC 5-10 0 - 5 /hpf    Bacteria Negative Negative /hpf    Mucus Trace /lpf   TRICHOMONAS RAPID AG    Collection Time: 10/17/21  1:52 PM   Result Value Ref Range    Trichomonas, rapid Ag Negative Negative         Radiologic Studies -   US PELV NON OBS   Final Result   1. No acute intrapelvic process. 2. The right ovary is not visualized and reportedly surgically absent. CT Results  (Last 48 hours)      None          CXR Results  (Last 48 hours)      None               If you feel that you have not received excellent quality care or timely care, please ask to speak to the nurse manager. Please choose us in the future for your continued health care needs. ------------------------------------------------------------------------------------------------------------  The exam and treatment you received in the Emergency Department were for an urgent problem and are not intended as complete care. It is important that you follow-up with a doctor, nurse practitioner, or physician assistant to:  (1) confirm your diagnosis,  (2) re-evaluation of changes in your illness and treatment, and  (3) for ongoing care.   If your symptoms become worse or you do not improve as expected and you are unable to reach your usual health care provider, you should return to the Emergency Department. We are available 24 hours a day. Please take your discharge instructions with you when you go to your follow-up appointment. If you have any problem arranging a follow-up appointment, contact the Emergency Department immediately. If a prescription has been provided, please have it filled as soon as possible to prevent a delay in treatment. Read the entire medication instruction sheet provided to you by the pharmacy. If you have any questions or reservations about taking the medication due to side effects or interactions with other medications, please call your primary care physician or contact the ER to speak with the charge nurse. Make an appointment with your family doctor or the physician you were referred to for follow-up of this visit as instructed on your discharge paperwork, as this is a mandatory follow-up. Return to the ER if you are unable to be seen or if you are unable to be seen in a timely manner. If you have any problem arranging the follow-up visit, contact the Emergency Department immediately.

## 2021-10-17 NOTE — LETTER
[address] [place] [address] EMERGENCY DEPT  Darinel Miles [address]  [phone]    Work/School Note    Date: 10/17/2021    To Whom It May concern:    Saroj Arora was seen and treated today in the emergency room by the following provider(s):  Attending Provider: Danell Osler, MD.      Saroj Arora is excused from work/school on 10/17/21 and 10/18/21. She is medically clear to return to work/school on 10/19/2021.        Sincerely,        Delio Graham MD

## 2021-10-20 LAB
C TRACH RRNA SPEC QL NAA+PROBE: NEGATIVE
N GONORRHOEA RRNA SPEC QL NAA+PROBE: NEGATIVE
PLEASE NOTE:, 188601: NORMAL
SPECIMEN SOURCE: NORMAL

## 2021-10-28 ENCOUNTER — OFFICE VISIT (OUTPATIENT)
Dept: OBGYN CLINIC | Age: 26
End: 2021-10-28
Payer: COMMERCIAL

## 2021-10-28 VITALS
HEART RATE: 116 BPM | DIASTOLIC BLOOD PRESSURE: 77 MMHG | HEIGHT: 64 IN | BODY MASS INDEX: 50.02 KG/M2 | WEIGHT: 293 LBS | OXYGEN SATURATION: 98 % | RESPIRATION RATE: 18 BRPM | SYSTOLIC BLOOD PRESSURE: 152 MMHG

## 2021-10-28 DIAGNOSIS — N76.0 VAGINITIS AND VULVOVAGINITIS: Primary | ICD-10-CM

## 2021-10-28 DIAGNOSIS — N83.202 CYST OF LEFT OVARY: ICD-10-CM

## 2021-10-28 PROCEDURE — 99203 OFFICE O/P NEW LOW 30 MIN: CPT | Performed by: OBSTETRICS & GYNECOLOGY

## 2021-10-28 RX ORDER — FLUCONAZOLE 150 MG/1
150 TABLET ORAL DAILY
Qty: 1 TABLET | Refills: 0 | Status: SHIPPED | OUTPATIENT
Start: 2021-10-28 | End: 2021-10-29

## 2021-10-28 RX ORDER — LANOLIN ALCOHOL/MO/W.PET/CERES
400 CREAM (GRAM) TOPICAL DAILY
COMMUNITY
End: 2022-02-18

## 2021-10-28 RX ORDER — IRON,CARBONYL/ASCORBIC ACID 65MG-125MG
TABLET, DELAYED RELEASE (ENTERIC COATED) ORAL
COMMUNITY
End: 2022-02-18

## 2021-10-28 NOTE — LETTER
10/28/2021    Patient: Dominic Burleson   YOB: 1995   Date of Visit: 10/28/2021     Len Vasquez, [address] [place] [address]  Via Fax: [phone]    Dear ELEN Aguilar,      Thank you for referring Ms. Aura Villeda to [place] [address] [facility] for evaluation. My notes for this consultation are attached. If you have questions, please do not hesitate to call me. I look forward to following your patient along with you.       Sincerely,    Choco White MD

## 2021-10-29 NOTE — PROGRESS NOTES
Silviano Whittaker is a 27 y.o. female, , Patient's last menstrual period was 2021 (exact date). , who presents today for the following:  Chief Complaint   Patient presents with    Gyn Exam     Pt c/o having ovarian cyst.  Pt states she feels pain on L side. No Known Allergies    Current Outpatient Medications   Medication Sig    Iron BisGl &PS [id number] mg-mg-mcg-mg cap Take  by mouth.  folic acid-vit O5-ROM E78 (FOLBEE, AV-OLIVIA, VIRT-OLIVIA) 2.5-25-1 mg tablet Take 1 Tablet by mouth daily.  folic acid 031 mcg tablet Take 400 mcg by mouth daily.  iron,carbonyl-vitamin C (Vitron-C) 65 mg iron- 125 mg TbEC Take  by mouth.  fluconazole (DIFLUCAN) 150 mg tablet Take 1 Tablet by mouth daily for 1 day. FDA advises cautious prescribing of oral fluconazole in pregnancy.  acetaminophen (TYLENOL) 500 mg tablet Take 2 Tablets by mouth every six (6) hours as needed for Pain for up to 40 days.  ibuprofen (MOTRIN) 400 mg tablet Take 1 Tablet by mouth every six (6) hours as needed for Pain.  ondansetron (Zofran ODT) 4 mg disintegrating tablet 1 Tablet by SubLINGual route every eight (8) hours as needed for Nausea or Vomiting. No current facility-administered medications for this visit. History reviewed. No pertinent past medical history. Past Surgical History:   Procedure Laterality Date    HX OOPHORECTOMY Right        History reviewed. No pertinent family history.     Social History     Socioeconomic History    Marital status: SINGLE     Spouse name: Not on file    Number of children: Not on file    Years of education: Not on file    Highest education level: Not on file   Occupational History    Not on file   Tobacco Use    Smoking status: Never Smoker    Smokeless tobacco: Never Used   Substance and Sexual Activity    Alcohol use: Never    Drug use: Never    Sexual activity: Yes     Partners: Male     Birth control/protection: Condom   Other Topics Concern    Not on file   Social History Narrative    Not on file     Social Determinants of Health     Financial Resource Strain:     Difficulty of Paying Living Expenses:    Food Insecurity:     Worried About Running Out of Food in the Last Year:     [address] in the Last Year:    Transportation Needs:     Lack of Transportation (Medical):  Lack of Transportation (Non-Medical):    Physical Activity:     Days of Exercise per Week:     Minutes of Exercise per Session:    Stress:     Feeling of Stress :    Social Connections:     Frequency of Communication with Friends and Family:     Frequency of Social Gatherings with Friends and Family:     Attends Religion Services:     Active Member of Clubs or Organizations:     Attends Club or Organization Meetings:     Marital Status:    Intimate Partner Violence:     Fear of Current or Ex-Partner:     Emotionally Abused:     Physically Abused:     Sexually Abused:          HPI        HPI  Reported by patient. Location: LLQ / pelvis  Onset/Timing: > 1 month    Quality: aching; cramping; pressure; fullness/bloating   Severity: interferes with normal activities    Associated Symptoms: no back pain; no chills; no constipation; no diarrhea; no vaginal discharge; episodes of difficulty urination; normal emptying of bladder; no feelings of urgency; no blood in the urine; normal libido; no fever; no nausea; no vomiting; no nocturia; Hx of right oophorectomy at 13 secondary to torison        Ultrasound from September 30  Uterus 7.4 x 3.7 x 3.8 cm. Normal  uterus contour and echotexture. Endometrial stripe 1.3 cm. Right ovary not  identified. Left adnexal 4.0 x 3.6 x 3.4 cm cystic and solid structure with  vascular flow. No free pelvic fluid.     IMPRESSION  1. Left adnexal mass likely arising from the left ovary or fallopian tube.   Recommend six-week follow-up pelvic ultrasound to ensure resolution, and no  underlying neoplastic process. Repeat ultrasound two week later reveal     The left ovary measures 4.7 x 3.2 x 2.7 cm. The right ovary is not visualized  and reported surgically absent. No dominant cyst is identified. There is normal  blood flow to the left ovary. There is no adnexal mass or significant free  fluid. Results reviewed and discussed with patient      Review of Systems   Constitutional: Negative. Respiratory: Negative. Cardiovascular: Negative. Gastrointestinal: Positive for abdominal pain. Genitourinary: Negative. Musculoskeletal: Negative. Skin: Negative. Neurological: Negative. Endo/Heme/Allergies: Negative. Psychiatric/Behavioral: Negative. All other systems reviewed and are negative. BP (!) 152/77 (BP 1 Location: Left upper arm, BP Patient Position: Sitting)   Pulse (!) 116   Resp 18   Ht 5' 4\" (1.626 m)   Wt 329 lb (149.2 kg)   LMP 09/30/2021 (Exact Date)   SpO2 98%   BMI 56.47 kg/m²    OBGyn Exam   PE:  Constitutional: General Appearance: healthy-appearing, well-nourished, well-developed, and well groomed. Psychiatric: Orientation: to time, place, and person. Mood and Affect: normal mood and affect and appropriate and active and alert. Abdomen: Auscultation/Inspection/Palpation: tenderness and mass and non-distended. Hernia: none palpated. Female Genitalia: Vulva: no masses, atrophy, or lesions. Bladder/Urethra: no urethral discharge or mass and normal meatus and bladder non distended. Vagina no tenderness, erythema, cystocele, rectocele, vaginal discharge      Cervix: no discharge or cervical motion tenderness and grossly normal.     Uterus: mobile, non-tender, and no uterine prolapse. Adnexa/Parametria: no adnexal tenderness. 1. Vaginitis and vulvovaginitis    - fluconazole (DIFLUCAN) 150 mg tablet; Take 1 Tablet by mouth daily for 1 day. FDA advises cautious prescribing of oral fluconazole in pregnancy. Dispense: 1 Tablet;  Refill: 0    2. Cyst of left ovary    - US PELV NON OBS; Future  Discussed with patient significance of ovarian cyst.  Management options discussed including but not limited observation, pain management, contraception. All questions answer and comprehension stated.

## 2021-11-09 DIAGNOSIS — N83.202 CYST OF LEFT OVARY: Primary | ICD-10-CM

## 2021-11-09 DIAGNOSIS — E66.01 CLASS 3 SEVERE OBESITY WITH BODY MASS INDEX (BMI) OF 50.0 TO 59.9 IN ADULT, UNSPECIFIED OBESITY TYPE, UNSPECIFIED WHETHER SERIOUS COMORBIDITY PRESENT (HCC): ICD-10-CM

## 2021-11-12 LAB
EST. AVERAGE GLUCOSE BLD GHB EST-MCNC: 114 MG/DL
FSH SERPL-ACNC: 6.3 MIU/ML
HBA1C MFR BLD: 5.6 % (ref 4.8–5.6)
LH SERPL-ACNC: 7.5 MIU/ML
PROLACTIN SERPL-MCNC: 31.7 NG/ML (ref 4.8–23.3)
TSH SERPL DL<=0.005 MIU/L-ACNC: 3.17 UIU/ML (ref 0.45–4.5)

## 2021-12-06 ENCOUNTER — PATIENT MESSAGE (OUTPATIENT)
Dept: OBGYN CLINIC | Age: 26
End: 2021-12-06

## 2021-12-06 DIAGNOSIS — R79.89 ELEVATED PROLACTIN LEVEL: Primary | ICD-10-CM

## 2021-12-06 NOTE — TELEPHONE ENCOUNTER
Spoke with the patient and advised her of her elevated prolactin level and the need for a referral to an endocrinologist.  Referral entered and Dr Haley Beltran contact information given.

## 2022-02-18 ENCOUNTER — INITIAL PRENATAL (OUTPATIENT)
Dept: OBGYN CLINIC | Age: 27
End: 2022-02-18
Payer: COMMERCIAL

## 2022-02-18 VITALS
OXYGEN SATURATION: 97 % | SYSTOLIC BLOOD PRESSURE: 157 MMHG | WEIGHT: 293 LBS | HEIGHT: 64 IN | HEART RATE: 113 BPM | BODY MASS INDEX: 50.02 KG/M2 | DIASTOLIC BLOOD PRESSURE: 103 MMHG

## 2022-02-18 DIAGNOSIS — N91.2 AMENORRHEA: Primary | ICD-10-CM

## 2022-02-18 DIAGNOSIS — Z01.419 GYNECOLOGIC EXAM NORMAL: ICD-10-CM

## 2022-02-18 DIAGNOSIS — O99.210 MATERNAL OBESITY AFFECTING PREGNANCY, ANTEPARTUM: ICD-10-CM

## 2022-02-18 DIAGNOSIS — O16.9 HYPERTENSION AFFECTING PREGNANCY, ANTEPARTUM: ICD-10-CM

## 2022-02-18 PROCEDURE — 99214 OFFICE O/P EST MOD 30 MIN: CPT | Performed by: OBSTETRICS & GYNECOLOGY

## 2022-02-18 PROCEDURE — 76801 OB US < 14 WKS SINGLE FETUS: CPT | Performed by: OBSTETRICS & GYNECOLOGY

## 2022-02-18 RX ORDER — LABETALOL 100 MG/1
100 TABLET, FILM COATED ORAL 2 TIMES DAILY
Qty: 60 TABLET | Refills: 3 | Status: SHIPPED | OUTPATIENT
Start: 2022-02-18 | End: 2022-04-01 | Stop reason: DRUGHIGH

## 2022-02-18 RX ORDER — METOCLOPRAMIDE 10 MG/1
10 TABLET ORAL
Qty: 120 TABLET | Refills: 3 | Status: SHIPPED | OUTPATIENT
Start: 2022-02-18 | End: 2022-03-20

## 2022-02-21 ENCOUNTER — OFFICE VISIT (OUTPATIENT)
Dept: ENDOCRINOLOGY | Age: 27
End: 2022-02-21
Payer: COMMERCIAL

## 2022-02-21 VITALS
SYSTOLIC BLOOD PRESSURE: 130 MMHG | WEIGHT: 293 LBS | HEART RATE: 106 BPM | DIASTOLIC BLOOD PRESSURE: 82 MMHG | OXYGEN SATURATION: 98 % | BODY MASS INDEX: 50.02 KG/M2 | HEIGHT: 64 IN | TEMPERATURE: 98.6 F | RESPIRATION RATE: 20 BRPM

## 2022-02-21 DIAGNOSIS — E22.1 HYPERPROLACTINEMIA (HCC): Primary | ICD-10-CM

## 2022-02-21 PROBLEM — O10.919 PRE-EXISTING HYPERTENSION DURING PREGNANCY: Status: ACTIVE | Noted: 2022-02-21

## 2022-02-21 PROCEDURE — 99204 OFFICE O/P NEW MOD 45 MIN: CPT | Performed by: INTERNAL MEDICINE

## 2022-02-21 RX ORDER — VITAMIN A ACETATE, BETA CAROTENE, ASCORBIC ACID, CHOLECALCIFEROL, .ALPHA.-TOCOPHEROL ACETATE, DL-, THIAMINE MONONITRATE, RIBOFLAVIN, NIACINAMIDE, PYRIDOXINE HYDROCHLORIDE, FOLIC ACID, CYANOCOBALAMIN, CALCIUM CARBONATE, FERROUS FUMARATE, ZINC OXIDE, CUPRIC OXIDE 3080; 12; 120; 400; 1; 1.84; 3; 20; 22; 920; 25; 200; 27; 10; 2 [IU]/1; UG/1; MG/1; [IU]/1; MG/1; MG/1; MG/1; MG/1; MG/1; [IU]/1; MG/1; MG/1; MG/1; MG/1; MG/1
1 TABLET, FILM COATED ORAL DAILY
COMMUNITY
End: 2022-10-07

## 2022-02-21 NOTE — PROGRESS NOTES
Watson Palmer is a 27 y.o. female, , Patient's last menstrual period was 2021., who presents today for the following:  Chief Complaint   Patient presents with    Initial Prenatal Visit     Pt c/o nausea. No Known Allergies    Current Outpatient Medications   Medication Sig    labetaloL (NORMODYNE) 100 mg tablet Take 1 Tablet by mouth two (2) times a day.  metoclopramide HCl (REGLAN) 10 mg tablet Take 1 Tablet by mouth Before breakfast, lunch, dinner and at bedtime for 30 days.  folic acid-vit U0-SDR H96 (FOLBEE, AV-OLIVIA, VIRT-OLIVIA) 2.5-25-1 mg tablet Take 1 Tablet by mouth daily. No current facility-administered medications for this visit. History reviewed. No pertinent past medical history. Past Surgical History:   Procedure Laterality Date    HX OOPHORECTOMY Right        History reviewed. No pertinent family history. Social History     Socioeconomic History    Marital status: SINGLE     Spouse name: Not on file    Number of children: Not on file    Years of education: Not on file    Highest education level: Not on file   Occupational History    Not on file   Tobacco Use    Smoking status: Never Smoker    Smokeless tobacco: Never Used   Substance and Sexual Activity    Alcohol use: Never    Drug use: Never    Sexual activity: Yes     Partners: Male     Birth control/protection: None   Other Topics Concern    Not on file   Social History Narrative    Not on file     Social Determinants of Health     Financial Resource Strain:     Difficulty of Paying Living Expenses: Not on file   Food Insecurity:     Worried About Running Out of Food in the Last Year: Not on file    Radha of Food in the Last Year: Not on file   Transportation Needs:     Lack of Transportation (Medical): Not on file    Lack of Transportation (Non-Medical):  Not on file   Physical Activity:     Days of Exercise per Week: Not on file    Minutes of Exercise per Session: Not on file Stress:     Feeling of Stress : Not on file   Social Connections:     Frequency of Communication with Friends and Family: Not on file    Frequency of Social Gatherings with Friends and Family: Not on file    Attends Advent Services: Not on file    Active Member of Clubs or Organizations: Not on file    Attends Club or Organization Meetings: Not on file    Marital Status: Not on file   Intimate Partner Violence:     Fear of Current or Ex-Partner: Not on file    Emotionally Abused: Not on file    Physically Abused: Not on file    Sexually Abused: Not on file   Housing Stability:     Unable to Pay for Housing in the Last Year: Not on file    Number of [place] in the Last Year: Not on file    Unstable Housing in the Last Year: Not on file         HPI  Patient presents for evaluation of amenorrhea and positive home pregnancy test  Doing well  No pain no bleeding since LMP  Reports nause  Currently on prenatal vitamins  Viable IUP seen on ultrasound- DWP    Review of Systems   Constitutional: Negative. Respiratory: Negative. Cardiovascular: Negative. Gastrointestinal: Positive for nausea. Genitourinary: Negative. Musculoskeletal: Negative. Skin: Negative. Neurological: Negative. Endo/Heme/Allergies: Negative. Psychiatric/Behavioral: Negative. All other systems reviewed and are negative. BP (!) 157/103 (BP 1 Location: Left upper arm, BP Patient Position: Sitting)   Pulse (!) 113   Ht 5' 4\" (1.626 m)   Wt 330 lb (149.7 kg)   LMP 12/03/2021   SpO2 97%   BMI 56.64 kg/m²    OBGyn Exam   PE:  Constitutional: General Appearance: healthy-appearing, well-nourished, well-developed, and well groomed. Psychiatric: Orientation: to time, place, and person. Mood and Affect: normal mood and affect and appropriate and active and alert. Abdomen: Auscultation/Inspection/Palpation: tenderness and mass and non-distended. Hernia: none palpated.      Female Genitalia: Vulva: no masses, atrophy, or lesions. Bladder/Urethra: no urethral discharge or mass and normal meatus and bladder non distended. Vagina no tenderness, erythema, cystocele, rectocele, abnormal vaginal discharge, or vesicle(s) or ulcers. Cervix: no discharge or cervical motion tenderness and grossly normal.     Uterus: mobile, non-tender, and no uterine prolapse. Adnexa/Parametria: no adnexal tenderness. 1. Amenorrhea    - HEP B SURFACE AG  - T PALLIDUM SCREEN W/REFLEX  - RUBELLA AB, IGG  - TYPE & SCREEN  - HIV 1/2 AG/AB, 4TH GENERATION,W RFLX CONFIRM  - CBC WITH AUTOMATED DIFF  - HEMOGLOBIN FRACTIONATION  - THYROID CASCADE PROFILE  - CMV AB, IGG  - CMV AB, IGM  - CYSTIC FIBROSIS MUTATION 97  - VITAMIN D, 25 HYDROXY  - metoclopramide HCl (REGLAN) 10 mg tablet; Take 1 Tablet by mouth Before breakfast, lunch, dinner and at bedtime for 30 days. Dispense: 120 Tablet; Refill: 3  - VITAMIN B12 & FOLATE  - HEPATITIS C AB  - AMB POC US OB < 14 WKS, 1ST GESTATION    2. Maternal obesity affecting pregnancy, antepartum    - REFERRAL TO MATERNAL FETAL MEDICINE  - HEMOGLOBIN A1C WITH EAG    3. Hypertension affecting pregnancy, antepartum    - labetaloL (NORMODYNE) 100 mg tablet; Take 1 Tablet by mouth two (2) times a day. Dispense: 60 Tablet; Refill: 3  - METABOLIC PANEL, COMPREHENSIVE  - URIC ACID; Future    4.  Gynecologic exam normal    - PAP IG, CT-NG-TV, APTIMA HPV AND [address] 49/75,79([id number])

## 2022-02-21 NOTE — PROGRESS NOTES
1. Have you been to the ER, urgent care clinic since your last visit? Hospitalized since your last visit? No    2. Have you seen or consulted any other health care providers outside of the [address] [place] [address] since your last visit? Include any pap smears or colon screening. No    Pt is 10 wks pregnant. Here for a elevated prolactin level from November.       Chief Complaint   Patient presents with   [facility] Patient     Elevated Prolactin 31.7     Visit Vitals  /82 (BP 1 Location: Right upper arm, BP Patient Position: Sitting, BP Cuff Size: Adult)   Pulse (!) 106   Temp 98.6 °F (37 °C) (Temporal)   Resp 20   Ht 5' 4\" (1.626 m)   Wt 332 lb (150.6 kg)   LMP 12/03/2021 (LMP Unknown)   SpO2 98%   BMI 56.99 kg/m²

## 2022-02-21 NOTE — PROGRESS NOTES
History and Physical    Patient: Ritesh Lin MRN: [id number]  SSN: [id number]    YOB: 1995  Age: 27 y.o. Sex: female      Subjective:      Ritesh Lin is a 27 y.o. female with past medical history of hypertension is sent to me by OB/GYN Dr. Lincoln Dai for elevated prolactin. She is currently 10 weeks pregnant. LMP: 12-3-2021  LUIS F: 9-    Patient was having ovarian cysts last year. When she went to OB/GYN, labs were done including prolactin, which came back elevated. This was in November 2021. Her last menstrual period was December 3, 2021. Currently she is 10 weeks pregnant. She tells me that prior to pregnancy her cycles were more or less regular. She did not have any breast pain or nipple discharge, no significant headaches. Does not take any nausea medications, any over-the-counter medications besides vitamin B12 and prenatal.  Does not smoke marijuana. No recreational drugs. This is her first pregnancy. No past medical history on file. Past Surgical History:   Procedure Laterality Date    HX OOPHORECTOMY Right       No family history on file. Social History     Tobacco Use    Smoking status: Never Smoker    Smokeless tobacco: Never Used   Substance Use Topics    Alcohol use: Never      Prior to Admission medications    Medication Sig Start Date End Date Taking? Authorizing Provider   prenatal vit-calcium-iron-fa (Prenatal Plus, calcium carb,) 27 mg iron- 1 mg tab Take 1 Tablet by mouth daily. Yes Provider, Historical   labetaloL (NORMODYNE) 100 mg tablet Take 1 Tablet by mouth two (2) times a day. 2/18/22  Yes Eddi Judge MD   metoclopramide HCl (REGLAN) 10 mg tablet Take 1 Tablet by mouth Before breakfast, lunch, dinner and at bedtime for 30 days. 2/18/22 3/20/22 Yes Eddi Judge MD   folic acid-vit A2-OZN L56 (FOLBEE, AV-OLIVIA, VIRT-OLIVIA) 2.5-25-1 mg tablet Take 1 Tablet by mouth daily.    Yes Provider, Historical        No Known Allergies    Review of Systems:  ROS    A comprehensive review of systems was preformed and it is negative except mentioned in HPI    Objective:     Vitals:    02/21/22 1402   BP: 130/82   Pulse: (!) 106   Resp: 20   Temp: 98.6 °F (37 °C)   TempSrc: Temporal   SpO2: 98%   Weight: 332 lb (150.6 kg)   Height: 5' 4\" (1.626 m)        Physical Exam:    Physical Exam  Vitals and nursing note reviewed. Constitutional:       Appearance: She is obese. HENT:      Head: Normocephalic and atraumatic. Cardiovascular:      Rate and Rhythm: Normal rate and regular rhythm. Pulmonary:      Effort: Pulmonary effort is normal.      Breath sounds: Normal breath sounds. Neurological:      General: No focal deficit present. Mental Status: She is alert and oriented to person, place, and time. Psychiatric:         Mood and Affect: Mood normal.         Behavior: Behavior normal.          Labs and Imaging:    Last 3 Recorded Weights in this Encounter    02/21/22 1402   Weight: 332 lb (150.6 kg)        Lab Results   Component Value Date/Time    Hemoglobin A1c 5.6 11/11/2021 02:36 PM        Assessment:     Patient Active Problem List   Diagnosis Code    Hyperprolactinemia ([facility] [address] E22.1    Pre-existing hypertension during pregnancy O10.919           Plan:     Hyperprolactinemia:  I reviewed labs and notes from the referring provider's office. 11-:  TSH normal at 3.17  Prolactin elevated at 31.7 (4.8-23. 3)    Patient is currently pregnant. Plan:  Discussed with patient that since she is pregnant, prolactin is going to be elevated at this time. Since previous prolactin elevation was only mild, we did not need any MRI at this time. Explained to her that prolactin will continue to be elevated during pregnancy as well as during breast-feeding. When she is completely done with breast-feeding, prolactin can be checked maybe 3 to 4 months later and if it is still elevated I can see her back.     Essential hypertension:  Blood pressure currently well controlled on current medications. No orders of the defined types were placed in this encounter.        Signed By: Jazzy Hill MD     February 21, 2022      Return to clinic as needed

## 2022-02-21 NOTE — PATIENT INSTRUCTIONS
Learning About Pregnancy  Your Care Instructions     Your health in the early weeks of your pregnancy is particularly important for your baby's health. Take good care of yourself. Anything you do that harms your body can also harm your baby. Make sure to go to all of your doctor appointments. Regular checkups will help keep you and your baby healthy. How can you care for yourself at home? Diet    · Eat a balanced diet. Make sure your diet includes plenty of beans, peas, and leafy green vegetables.     · Do not skip meals or go for many hours without eating. If you are nauseated, try to eat a small, healthy snack every 2 to 3 hours.     · Do not eat fish that has a high level of mercury, such as shark, swordfish, or mackerel. Do not eat more than one can of tuna each week.     · Drink plenty of fluids. If you have kidney, heart, or liver disease and have to limit fluids, talk with your doctor before you increase the amount of fluids you drink.     · Cut down on caffeine, such as coffee, tea, and cola.     · Do not drink alcohol, such as beer, wine, or hard liquor.     · Take a multivitamin that contains at least 400 micrograms (mcg) of folic acid to help prevent birth defects. Fortified cereal and whole wheat bread are good additional sources of folic acid.     · Increase the calcium in your diet. Try to drink a quart of skim milk each day. You may also take calcium supplements and choose foods such as cheese and yogurt. Lifestyle    · Make sure you go to your follow-up appointments.     · Get plenty of rest. You may be unusually tired while you are pregnant.     · Get at least 30 minutes of exercise on most days of the week. Walking is a good choice. If you have not exercised in the past, start out slowly. Take several short walks each day.     · Do not smoke. If you need help quitting, talk to your doctor about stop-smoking programs.  These can increase your chances of quitting for good.     · Do not touch cat feces or litter boxes. Also, wash your hands after you handle raw meat, and fully cook all meat before you eat it. Wear gloves when you work in the yard or garden, and wash your hands well when you are done. Cat feces, raw or undercooked meat, and contaminated dirt can cause an infection that may harm your baby or lead to a miscarriage.     · Do not use saunas or hot tubs. Raising your body temperature may harm your baby.     · Avoid chemical fumes, paint fumes, or poisons.     · Do not use illegal drugs, marijuana, or alcohol. Medicines    · Review all of your medicines with your doctor. Some of your routine medicines may need to be changed to protect your baby.     · Use acetaminophen (Tylenol) to relieve minor problems, such as a mild headache or backache or a mild fever with cold symptoms. Do not use nonsteroidal anti-inflammatory drugs (NSAIDs), such as ibuprofen (Advil, Motrin) or naproxen (Aleve), unless your doctor says it is okay.     · Do not take two or more pain medicines at the same time unless the doctor told you to. Many pain medicines have acetaminophen, which is Tylenol. Too much acetaminophen (Tylenol) can be harmful.     · Take your medicines exactly as prescribed. Call your doctor if you think you are having a problem with your medicine. To manage morning sickness    · If you feel sick when you first wake up, try eating a small snack (such as crackers) before you get out of bed. Allow some time to digest the snack, and then get out of bed slowly.     · Do not skip meals or go for long periods without eating. An empty stomach can make nausea worse.     · Eat small, frequent meals instead of three large meals each day.     · Drink plenty of fluids.     · Eat foods that are high in protein but low in fat.     · If you are taking iron supplements, ask your doctor if they are necessary.  Iron can make nausea worse.     · Avoid any smells, such as coffee, that make you feel sick.     · Get lots of rest. Morning sickness may be worse when you are tired. Follow-up care is a key part of your treatment and safety. Be sure to make and go to all appointments, and call your doctor if you are having problems. It's also a good idea to know your test results and keep a list of the medicines you take. Where can you learn more? Go to http://www.gray.com/  Enter G839 in the search box to learn more about \"Learning About Pregnancy. \"  Current as of: June 16, 2021               Content Version: 13.0  © 9262-2713 [employer], [employer]. Care instructions adapted under license by [employer] (which disclaims liability or warranty for this information). If you have questions about a medical condition or this instruction, always ask your healthcare professional. [address] any warranty or liability for your use of this information.

## 2022-02-23 LAB
C TRACH RRNA CVX QL NAA+PROBE: NEGATIVE
CYTOLOGIST CVX/VAG CYTO: NORMAL
CYTOLOGY CVX/VAG DOC CYTO: NORMAL
CYTOLOGY CVX/VAG DOC THIN PREP: NORMAL
DX ICD CODE: NORMAL
HPV I/H RISK 4 DNA CVX QL PROBE+SIG AMP: NEGATIVE
Lab: NORMAL
N GONORRHOEA RRNA CVX QL NAA+PROBE: NEGATIVE
OTHER STN SPEC: NORMAL
STAT OF ADQ CVX/VAG CYTO-IMP: NORMAL
T VAGINALIS RRNA SPEC QL NAA+PROBE: NEGATIVE

## 2022-02-28 ENCOUNTER — PATIENT MESSAGE (OUTPATIENT)
Dept: OBGYN CLINIC | Age: 27
End: 2022-02-28

## 2022-03-03 LAB
25(OH)D3+25(OH)D2 SERPL-MCNC: 4.6 NG/ML (ref 30–100)
ABO GROUP BLD: NORMAL
ALBUMIN SERPL-MCNC: 4.3 G/DL (ref 3.9–5)
ALBUMIN/GLOB SERPL: 1.7 {RATIO} (ref 1.2–2.2)
ALP SERPL-CCNC: 52 IU/L (ref 44–121)
ALT SERPL-CCNC: 12 IU/L (ref 0–32)
AST SERPL-CCNC: 15 IU/L (ref 0–40)
BASOPHILS # BLD AUTO: 0.1 X10E3/UL (ref 0–0.2)
BASOPHILS NFR BLD AUTO: 1 %
BILIRUB SERPL-MCNC: 0.4 MG/DL (ref 0–1.2)
BLD GP AB SCN SERPL QL: NEGATIVE
BUN SERPL-MCNC: 5 MG/DL (ref 6–20)
BUN/CREAT SERPL: 9 (ref 9–23)
CALCIUM SERPL-MCNC: 9.3 MG/DL (ref 8.7–10.2)
CFTR MUT ANL BLD/T: ABNORMAL
CHLORIDE SERPL-SCNC: 101 MMOL/L (ref 96–106)
CMV IGG SERPL IA-ACNC: >10 U/ML (ref 0–0.59)
CMV IGM SERPL IA-ACNC: <30 AU/ML (ref 0–29.9)
CO2 SERPL-SCNC: 18 MMOL/L (ref 20–29)
CREAT SERPL-MCNC: 0.58 MG/DL (ref 0.57–1)
EOSINOPHIL # BLD AUTO: 0.1 X10E3/UL (ref 0–0.4)
EOSINOPHIL NFR BLD AUTO: 1 %
ERYTHROCYTE [DISTWIDTH] IN BLOOD BY AUTOMATED COUNT: 16.8 % (ref 11.7–15.4)
EST. AVERAGE GLUCOSE BLD GHB EST-MCNC: 108 MG/DL
FOLATE SERPL-MCNC: >20 NG/ML
GENE DIS ANL CARRIER INTERP-IMP: ABNORMAL
GLOBULIN SER CALC-MCNC: 2.6 G/DL (ref 1.5–4.5)
GLUCOSE SERPL-MCNC: 99 MG/DL (ref 65–99)
HBA1C MFR BLD: 5.4 % (ref 4.8–5.6)
HBV SURFACE AG SERPL QL IA: NEGATIVE
HCT VFR BLD AUTO: 36.9 % (ref 34–46.6)
HCV AB S/CO SERPL IA: <0.1 S/CO RATIO (ref 0–0.9)
HGB A MFR BLD ELPH: 97.5 % (ref 96.4–98.8)
HGB A2 MFR BLD ELPH: 2.5 % (ref 1.8–3.2)
HGB BLD-MCNC: 12.2 G/DL (ref 11.1–15.9)
HGB F MFR BLD ELPH: 0 % (ref 0–2)
HGB FRACT BLD-IMP: NORMAL
HGB S MFR BLD ELPH: 0 %
HIV 1+2 AB+HIV1 P24 AG SERPL QL IA: NON REACTIVE
IMM GRANULOCYTES # BLD AUTO: 0 X10E3/UL (ref 0–0.1)
IMM GRANULOCYTES NFR BLD AUTO: 0 %
LYMPHOCYTES # BLD AUTO: 2.8 X10E3/UL (ref 0.7–3.1)
LYMPHOCYTES NFR BLD AUTO: 26 %
MCH RBC QN AUTO: 25.4 PG (ref 26.6–33)
MCHC RBC AUTO-ENTMCNC: 33.1 G/DL (ref 31.5–35.7)
MCV RBC AUTO: 77 FL (ref 79–97)
MONOCYTES # BLD AUTO: 0.7 X10E3/UL (ref 0.1–0.9)
MONOCYTES NFR BLD AUTO: 6 %
NEUTROPHILS # BLD AUTO: 7.3 X10E3/UL (ref 1.4–7)
NEUTROPHILS NFR BLD AUTO: 66 %
PLATELET # BLD AUTO: 336 X10E3/UL (ref 150–450)
POTASSIUM SERPL-SCNC: 4.1 MMOL/L (ref 3.5–5.2)
PROT SERPL-MCNC: 6.9 G/DL (ref 6–8.5)
RBC # BLD AUTO: 4.81 X10E6/UL (ref 3.77–5.28)
RH BLD: POSITIVE
RUBV IGG SERPL IA-ACNC: <0.9 INDEX
SODIUM SERPL-SCNC: 137 MMOL/L (ref 134–144)
TREPONEMA PALLIDUM IGG+IGM AB [PRESENCE] IN SERUM OR PLASMA BY IMMUNOASSAY: NON REACTIVE
TSH SERPL DL<=0.005 MIU/L-ACNC: 2.3 UIU/ML (ref 0.45–4.5)
URATE SERPL-MCNC: 6 MG/DL (ref 2.6–6.2)
VIT B12 SERPL-MCNC: 289 PG/ML (ref 232–1245)
WBC # BLD AUTO: 11 X10E3/UL (ref 3.4–10.8)

## 2022-03-07 ENCOUNTER — TELEPHONE (OUTPATIENT)
Dept: OBGYN CLINIC | Age: 27
End: 2022-03-07

## 2022-03-07 NOTE — TELEPHONE ENCOUNTER
Patient made aware of the 1 variant for CF being detected and the need for the father of the baby to be tested. Advised her to have him see his PCP for testing.

## 2022-03-18 ENCOUNTER — ROUTINE PRENATAL (OUTPATIENT)
Dept: OBGYN CLINIC | Age: 27
End: 2022-03-18
Payer: COMMERCIAL

## 2022-03-18 VITALS
SYSTOLIC BLOOD PRESSURE: 158 MMHG | BODY MASS INDEX: 50.02 KG/M2 | OXYGEN SATURATION: 96 % | RESPIRATION RATE: 18 BRPM | HEART RATE: 111 BPM | WEIGHT: 293 LBS | HEIGHT: 64 IN | DIASTOLIC BLOOD PRESSURE: 96 MMHG

## 2022-03-18 DIAGNOSIS — O09.90 HIGH RISK PREGNANCY, ANTEPARTUM: ICD-10-CM

## 2022-03-18 DIAGNOSIS — O16.9 HYPERTENSION AFFECTING PREGNANCY, ANTEPARTUM: Primary | ICD-10-CM

## 2022-03-18 DIAGNOSIS — Z14.1 CYSTIC FIBROSIS CARRIER IN THIRD TRIMESTER, ANTEPARTUM: ICD-10-CM

## 2022-03-18 DIAGNOSIS — O99.210 MATERNAL OBESITY AFFECTING PREGNANCY, ANTEPARTUM: ICD-10-CM

## 2022-03-18 DIAGNOSIS — Z3A.13 13 WEEKS GESTATION OF PREGNANCY: ICD-10-CM

## 2022-03-18 DIAGNOSIS — O09.893 CYSTIC FIBROSIS CARRIER IN THIRD TRIMESTER, ANTEPARTUM: ICD-10-CM

## 2022-03-18 PROBLEM — O10.919 PRE-EXISTING HYPERTENSION DURING PREGNANCY: Status: ACTIVE | Noted: 2022-02-21

## 2022-03-18 PROCEDURE — 0501F PRENATAL FLOW SHEET: CPT | Performed by: OBSTETRICS & GYNECOLOGY

## 2022-03-18 RX ORDER — LABETALOL 100 MG/1
200 TABLET, FILM COATED ORAL 2 TIMES DAILY
Qty: 60 TABLET | Refills: 0 | Status: SHIPPED | OUTPATIENT
Start: 2022-03-18 | End: 2022-04-01 | Stop reason: DRUGHIGH

## 2022-03-18 NOTE — PROGRESS NOTES
SUBJECTIVE:  Rayne Khoury presents today for return prenatal visit. She complains of: no unusual complaints. Schedule to see MFM. No pain no bleeding    Patient's medical, obstetrical, social, and family histories; medications; and lab results have been reviewed. REVIEW OF SYSTEMS: A comprehensive review of systems was negative except for that written in the HPI.    13w4d    OBJECTIVE:   Visit Vitals  BP (!) 158/96 (BP 1 Location: Right lower arm, BP Patient Position: Sitting)   Pulse (!) 111   Resp 18   Ht 5' 4\" (1.626 m)   Wt 329 lb (149.2 kg)   LMP 12/03/2021 (LMP Unknown)   SpO2 96%   BMI 56.47 kg/m²      Refer to Prenatal Physical for exam findings    ASSESSMENT/PLAN:  Prenatal Education: 1st trimester topics:  Labs reviewed with patient,  Birda Salts increased to 200mg BID. Genetic screening. Start ASA at 16 week  Patient/guardian understands and agrees with the plan of care.

## 2022-03-18 NOTE — PATIENT INSTRUCTIONS
Weeks 10 to 14 of Your Pregnancy: Care Instructions  Overview     By weeks 10 to 14 of your pregnancy, the placenta has formed inside your uterus. The placenta's main job is to give your baby oxygen and nutrients through the umbilical cord. It's possible to hear your baby's heartbeat with a special ultrasound device. Your baby's organs are developing. The arms and legs can bend. This is a good time to think about testing for birth defects. There are two types of tests: screening and diagnostic. Screening tests show the chance that a baby has a certain birth defect. They can't tell you for sure that your baby has a problem. Diagnostic tests show if a baby has a certain birth defect. It's your choice whether to have these tests. You and your partner can talk to your doctor or midwife about tests for birth defects. Follow-up care is a key part of your treatment and safety. Be sure to make and go to all appointments, and call your doctor if you are having problems. It's also a good idea to know your test results and keep a list of the medicines you take. How can you care for yourself at home? Decide about tests  · You can have screening tests and diagnostic tests to check for birth defects. The decision to have a test for birth defects is personal. Think about your age, your chance of passing on a family disease, your need to know about any problems, and what you might do after you have the test results. ? Quadruple (quad) blood test. This screening test can be done between 15 and 22 weeks of pregnancy. It checks the amount of four substances in your blood. The doctor looks at these test results, along with your age and other factors, to find out the chance that your baby may have certain problems. ? Amniocentesis. This diagnostic test is used to look for chromosomal problems in the baby's cells.  It can be done between 15 and 20 weeks of pregnancy, usually around week 16.  ? Nuchal translucency test. This test uses ultrasound to measure the thickness of the area at the back of the baby's neck. An increase in the thickness can be an early sign of Down syndrome. ? Chorionic villus sampling (CVS). This is a test that looks for certain genetic problems with your baby. The same genes that are in your baby are in the placenta. A small piece of the placenta is taken out and tested. This test is done when you are 10 to 13 weeks pregnant. Ease discomfort  · Slow down and take naps when you feel tired. · If your emotions swing, talk to someone. · If your gums bleed, try a softer toothbrush. If your gums are puffy and bleed a lot, see your dentist.  · If you feel dizzy:  ? Get up slowly after sitting or lying down. ? Drink plenty of fluids. ? Eat small snacks to keep your blood sugar stable. ? Put your head between your legs as though you were tying your shoelaces. ? Lie down with your legs higher than your head. Use pillows to prop up your feet. · If you have a headache:  ? Lie down. ? Ask your partner or a good friend for a neck massage. ? Try cool cloths over your forehead or across the back of your neck. ? Use acetaminophen (Tylenol) for pain relief. Do not use nonsteroidal anti-inflammatory drugs (NSAIDs), such as ibuprofen (Advil, Motrin) or naproxen (Aleve), unless your doctor says it is okay. · If you have a nosebleed, pinch your nose gently, and hold it for a short while. To prevent nosebleeds, try massaging a small dab of petroleum jelly, such as Vaseline, in your nostrils. · If your nose is stuffed up, try saline (saltwater) nose sprays. Do not use decongestant sprays. Care for your breasts  · Wear a bra that gives you good support. · Know that changes in your breasts are normal.  ? Your breasts may get larger and more tender. Tenderness usually gets better by 12 weeks. ? Your nipples may get darker and larger, and small bumps around your nipples may show more. ?  The veins in your chest and breasts may show more. Where can you learn more? Go to http://www.gray.com/  Enter V658 in the search box to learn more about \"Weeks 10 to 14 of Your Pregnancy: Care Instructions. \"  Current as of: June 16, 2021               Content Version: 13.2  © 5552-2583 Healthwise, [employer]. Care instructions adapted under license by [employer] (which disclaims liability or warranty for this information). If you have questions about a medical condition or this instruction, always ask your healthcare professional. [place] [address] any warranty or liability for your use of this information.

## 2022-03-19 PROBLEM — E22.1 HYPERPROLACTINEMIA (HCC): Status: ACTIVE | Noted: 2022-02-21

## 2022-03-26 LAB
ABOUT THE TEST: NORMAL
FET TS 13 RISK PLAS.CFDNA QL: NEGATIVE
FETAL FRACTION: NORMAL
FETAL SEX: NORMAL
GA EST FROM CONCEPTION DATE: NORMAL D
GESTATIONAL AGE >=9W: YES
LAB DIRECTOR NAME PROVIDER: NORMAL
LAB DIRECTOR NAME PROVIDER: NORMAL
LIMITATIONS OF THE TEST: NORMAL
NEGATIVE PREDICTIVE VALUE: NORMAL
NOTE: NORMAL
PERFORMANCE CHARACTERISTICS: NORMAL
POSITIVE PREDICTIVE VALUE: NORMAL
REF LAB TEST METHOD: NORMAL
REFERENCES: NORMAL
RESULT, 451942: NEGATIVE
TEST PERFORMANCE INFO SPEC: NORMAL
TRISOMY 18 (EDWARDS SYNDROME): NEGATIVE
TRISOMY 21 (DOWN SYNDROME): NEGATIVE

## 2022-03-31 DIAGNOSIS — O16.9 HYPERTENSION AFFECTING PREGNANCY, ANTEPARTUM: ICD-10-CM

## 2022-04-01 DIAGNOSIS — O16.9 HYPERTENSION DURING PREGNANCY, ANTEPARTUM, UNSPECIFIED HYPERTENSION IN PREGNANCY TYPE: Primary | ICD-10-CM

## 2022-04-01 RX ORDER — LABETALOL 100 MG/1
TABLET, FILM COATED ORAL
Qty: 60 TABLET | Refills: 1 | Status: SHIPPED | OUTPATIENT
Start: 2022-04-01 | End: 2022-05-20

## 2022-04-05 RX ORDER — LABETALOL 100 MG/1
200 TABLET, FILM COATED ORAL 2 TIMES DAILY
Qty: 60 TABLET | Refills: 0 | Status: SHIPPED | OUTPATIENT
Start: 2022-04-05 | End: 2022-04-22

## 2022-04-22 ENCOUNTER — ROUTINE PRENATAL (OUTPATIENT)
Dept: OBGYN CLINIC | Age: 27
End: 2022-04-22
Payer: COMMERCIAL

## 2022-04-22 VITALS
DIASTOLIC BLOOD PRESSURE: 91 MMHG | SYSTOLIC BLOOD PRESSURE: 165 MMHG | HEIGHT: 64 IN | BODY MASS INDEX: 50.02 KG/M2 | WEIGHT: 293 LBS | RESPIRATION RATE: 18 BRPM | HEART RATE: 117 BPM | OXYGEN SATURATION: 98 %

## 2022-04-22 DIAGNOSIS — Z3A.18 18 WEEKS GESTATION OF PREGNANCY: ICD-10-CM

## 2022-04-22 DIAGNOSIS — O99.210 MATERNAL OBESITY AFFECTING PREGNANCY, ANTEPARTUM: ICD-10-CM

## 2022-04-22 DIAGNOSIS — O16.9 HYPERTENSION AFFECTING PREGNANCY, ANTEPARTUM: Primary | ICD-10-CM

## 2022-04-22 DIAGNOSIS — Z14.1 CYSTIC FIBROSIS CARRIER: ICD-10-CM

## 2022-04-22 PROCEDURE — 0502F SUBSEQUENT PRENATAL CARE: CPT | Performed by: OBSTETRICS & GYNECOLOGY

## 2022-04-22 RX ORDER — ASPIRIN 81 MG/1
81 TABLET ORAL DAILY
Qty: 90 TABLET | Refills: 0 | Status: SHIPPED | OUTPATIENT
Start: 2022-04-22

## 2022-04-22 RX ORDER — LABETALOL 100 MG/1
300 TABLET, FILM COATED ORAL 2 TIMES DAILY
Qty: 90 TABLET | Refills: 3 | Status: SHIPPED | OUTPATIENT
Start: 2022-04-22 | End: 2022-08-15

## 2022-04-22 RX ORDER — LABETALOL 100 MG/1
200 TABLET, FILM COATED ORAL 2 TIMES DAILY
Qty: 90 TABLET | Refills: 0 | Status: SHIPPED | OUTPATIENT
Start: 2022-04-22 | End: 2022-04-22 | Stop reason: DRUGHIGH

## 2022-04-22 NOTE — PROGRESS NOTES
Visit Vitals  BP (!) 165/91 (BP 1 Location: Left upper arm, BP Patient Position: Sitting)   Pulse (!) 117   Resp 18   Ht 5' 4\" (1.626 m)   Wt 327 lb (148.3 kg)   LMP 12/03/2021 (LMP Unknown)   SpO2 98%   BMI 56.13 kg/m²     Pt c/o abrasion on stomach.

## 2022-04-22 NOTE — PATIENT INSTRUCTIONS
Counting Your Baby's Kicks: Care Instructions  Overview     Counting your baby's kicks is one way your doctor can tell that your baby is healthy. Most women--especially in a first pregnancy--feel their baby move for the first time between 16 and 22 weeks. The movement may feel like flutters rather than kicks. Your baby may move more at certain times of the day. When you are active, you may notice less kicking than when you are resting. At your prenatal visits, your doctor will ask whether the baby is active. In your last trimester, your doctor may ask you to count the number of times you feel your baby move. Follow-up care is a key part of your treatment and safety. Be sure to make and go to all appointments, and call your doctor if you are having problems. It's also a good idea to know your test results and keep a list of the medicines you take. How do you count fetal kicks? · A common method of checking your baby's movement is to note the length of time it takes to count ten movements (such as kicks, flutters, or rolls). · Pick your baby's most active time of day to count. This may be any time from morning to evening. · If you don't feel 10 movements in an hour, have something to eat or drink and count for another hour. If you don't feel at least 10 movements in the 2-hour period, call your doctor. When should you call for help? Call your doctor now or seek immediate medical care if:    · You noticed that your baby has stopped moving or is moving much less than normal.   Watch closely for changes in your health, and be sure to contact your doctor if you have any problems. Where can you learn more? Go to http://www.gray.com/  Enter [id number] in the search box to learn more about \"Counting Your Baby's Kicks: Care Instructions. \"  Current as of: June 16, 2021               Content Version: 13.2  © 6119-0186 [employer].    Care instructions adapted under license by [employer] (which disclaims liability or warranty for this information). If you have questions about a medical condition or this instruction, always ask your healthcare professional. [address] any warranty or liability for your use of this information.

## 2022-04-22 NOTE — PROGRESS NOTES
SUBJECTIVE:  Cyn Tucker presents today for return prenatal visit. She complains of: no unusual complaints. Scheduled to see [facility]    Patient's medical, obstetrical, social, and family histories; medications; and lab results have been reviewed. REVIEW OF SYSTEMS: A comprehensive review of systems was negative except for that written in the HPI.    18w4d    OBJECTIVE:   Visit Vitals  BP (!) 165/91 (BP 1 Location: Left upper arm, BP Patient Position: Sitting)   Pulse (!) 117   Resp 18   Ht 5' 4\" (1.626 m)   Wt 327 lb (148.3 kg)   LMP 2021 (LMP Unknown)   SpO2 98%   BMI 56.13 kg/m²      Refer to Prenatal Physical for exam findings    ASSESSMENT/PLAN:  Prenatal Education: 2nd trimester topics:  fetal movement count handout given, pregnancy danger signs and signs and symptoms of  labor. Increase labetlol 300mg TID , start ASA 81 mg    Patient/guardian understands and agrees with the plan of care.

## 2022-04-27 DIAGNOSIS — O16.9 HYPERTENSION AFFECTING PREGNANCY, ANTEPARTUM: ICD-10-CM

## 2022-04-28 RX ORDER — LABETALOL 100 MG/1
200 TABLET, FILM COATED ORAL 2 TIMES DAILY
Qty: 60 TABLET | Refills: 0 | Status: SHIPPED | OUTPATIENT
Start: 2022-04-28 | End: 2022-05-20

## 2022-04-29 LAB
2ND TRIMESTER 4 SCREEN SERPL-IMP: NORMAL
AFP ADJ MOM SERPL: 1.49
AFP SERPL-MCNC: 48.3 NG/ML
AGE AT DELIVERY: 29 YR
FET TS 18 RISK FROM MAT AGE: NORMAL
FET TS 21 RISK FROM MAT AGE: 781
GA METHOD: NORMAL
GA: 18.7 WEEKS
HCG ADJ MOM SERPL: 1.48
HCG SERPL-ACNC: NORMAL MIU/ML
IDDM PATIENT QL: NO
INHIBIN A ADJ MOM SERPL: 0.85
INHIBIN A SERPL-MCNC: 89.06 PG/ML
MULTIPLE PREGNANCY: NO
NEURAL TUBE DEFECT RISK FETUS: 2809 %
SERVICE CMNT-IMP: NORMAL
TS 18 RISK FETUS: NORMAL
TS 21 RISK FETUS: NORMAL
U ESTRIOL ADJ MOM SERPL: 1.15
U ESTRIOL SERPL-MCNC: 1.57 NG/ML

## 2022-05-20 ENCOUNTER — ROUTINE PRENATAL (OUTPATIENT)
Dept: OBGYN CLINIC | Age: 27
End: 2022-05-20
Payer: COMMERCIAL

## 2022-05-20 ENCOUNTER — HOSPITAL ENCOUNTER (OUTPATIENT)
Age: 27
Discharge: HOME | End: 2022-05-20
Attending: OBSTETRICS & GYNECOLOGY | Admitting: OBSTETRICS & GYNECOLOGY
Payer: COMMERCIAL

## 2022-05-20 VITALS
HEART RATE: 113 BPM | SYSTOLIC BLOOD PRESSURE: 145 MMHG | BODY MASS INDEX: 50.02 KG/M2 | WEIGHT: 293 LBS | RESPIRATION RATE: 18 BRPM | HEIGHT: 64 IN | OXYGEN SATURATION: 99 % | DIASTOLIC BLOOD PRESSURE: 101 MMHG

## 2022-05-20 VITALS
HEIGHT: 64 IN | WEIGHT: 293 LBS | OXYGEN SATURATION: 97 % | DIASTOLIC BLOOD PRESSURE: 62 MMHG | HEART RATE: 107 BPM | BODY MASS INDEX: 50.02 KG/M2 | RESPIRATION RATE: 20 BRPM | TEMPERATURE: 99 F | SYSTOLIC BLOOD PRESSURE: 118 MMHG

## 2022-05-20 DIAGNOSIS — Z14.1 CYSTIC FIBROSIS CARRIER: ICD-10-CM

## 2022-05-20 DIAGNOSIS — O16.9 HYPERTENSION AFFECTING PREGNANCY, ANTEPARTUM: Primary | ICD-10-CM

## 2022-05-20 DIAGNOSIS — Z3A.22 22 WEEKS GESTATION OF PREGNANCY: ICD-10-CM

## 2022-05-20 DIAGNOSIS — O99.210 MATERNAL OBESITY AFFECTING PREGNANCY, ANTEPARTUM: ICD-10-CM

## 2022-05-20 PROBLEM — Z34.90 PREGNANT: Status: ACTIVE | Noted: 2022-05-20

## 2022-05-20 LAB
ALBUMIN SERPL-MCNC: 3.1 G/DL (ref 3.5–5)
ALBUMIN/GLOB SERPL: 0.8 {RATIO} (ref 1.1–2.2)
ALP SERPL-CCNC: 46 U/L (ref 45–117)
ALT SERPL-CCNC: 19 U/L (ref 12–78)
ANION GAP SERPL CALC-SCNC: 8 MMOL/L (ref 5–15)
APPEARANCE UR: ABNORMAL
AST SERPL W P-5'-P-CCNC: 11 U/L (ref 15–37)
BACTERIA URNS QL MICRO: NEGATIVE /HPF
BASOPHILS # BLD: 0 K/UL (ref 0–0.1)
BASOPHILS NFR BLD: 0 % (ref 0–1)
BILIRUB SERPL-MCNC: 0.3 MG/DL (ref 0.2–1)
BILIRUB UR QL: NEGATIVE
BUN SERPL-MCNC: 5 MG/DL (ref 6–20)
BUN/CREAT SERPL: 9 (ref 12–20)
CA-I BLD-MCNC: 9.4 MG/DL (ref 8.5–10.1)
CHLORIDE SERPL-SCNC: 106 MMOL/L (ref 97–108)
CO2 SERPL-SCNC: 22 MMOL/L (ref 21–32)
COLOR UR: ABNORMAL
CREAT SERPL-MCNC: 0.54 MG/DL (ref 0.55–1.02)
CREAT UR-MCNC: 186 MG/DL
DIFFERENTIAL METHOD BLD: ABNORMAL
EOSINOPHIL # BLD: 0.2 K/UL (ref 0–0.4)
EOSINOPHIL NFR BLD: 1 % (ref 0–7)
ERYTHROCYTE [DISTWIDTH] IN BLOOD BY AUTOMATED COUNT: 14.9 % (ref 11.5–14.5)
GLOBULIN SER CALC-MCNC: 4.1 G/DL (ref 2–4)
GLUCOSE SERPL-MCNC: 90 MG/DL (ref 65–100)
GLUCOSE UR STRIP.AUTO-MCNC: NEGATIVE MG/DL
HCT VFR BLD AUTO: 33.5 % (ref 35–47)
HGB BLD-MCNC: 10.9 G/DL (ref 11.5–16)
HGB UR QL STRIP: NEGATIVE
IMM GRANULOCYTES # BLD AUTO: 0 K/UL (ref 0–0.04)
IMM GRANULOCYTES NFR BLD AUTO: 0 % (ref 0–0.5)
KETONES UR QL STRIP.AUTO: NEGATIVE MG/DL
LEUKOCYTE ESTERASE UR QL STRIP.AUTO: ABNORMAL
LYMPHOCYTES # BLD: 2.6 K/UL (ref 0.8–3.5)
LYMPHOCYTES NFR BLD: 22 % (ref 12–49)
MCH RBC QN AUTO: 26.9 PG (ref 26–34)
MCHC RBC AUTO-ENTMCNC: 32.5 G/DL (ref 30–36.5)
MCV RBC AUTO: 82.7 FL (ref 80–99)
MONOCYTES # BLD: 0.8 K/UL (ref 0–1)
MONOCYTES NFR BLD: 7 % (ref 5–13)
MUCOUS THREADS URNS QL MICRO: ABNORMAL /LPF
NEUTS SEG # BLD: 8 K/UL (ref 1.8–8)
NEUTS SEG NFR BLD: 70 % (ref 32–75)
NITRITE UR QL STRIP.AUTO: NEGATIVE
NRBC # BLD: 0 K/UL (ref 0–0.01)
NRBC BLD-RTO: 0 PER 100 WBC
PH UR STRIP: 6 [PH] (ref 5–8)
PLATELET # BLD AUTO: 308 K/UL (ref 150–400)
PMV BLD AUTO: 10.6 FL (ref 8.9–12.9)
POTASSIUM SERPL-SCNC: 4 MMOL/L (ref 3.5–5.1)
PROT SERPL-MCNC: 7.2 G/DL (ref 6.4–8.2)
PROT UR STRIP-MCNC: NEGATIVE MG/DL
PROT UR-MCNC: 30 MG/DL (ref 0–11.9)
PROT/CREAT UR-RTO: 0.2
RBC # BLD AUTO: 4.05 M/UL (ref 3.8–5.2)
RBC #/AREA URNS HPF: ABNORMAL /HPF (ref 0–5)
SODIUM SERPL-SCNC: 136 MMOL/L (ref 136–145)
SP GR UR REFRACTOMETRY: 1.02 (ref 1–1.03)
UROBILINOGEN UR QL STRIP.AUTO: 0.1 EU/DL (ref 0.1–1)
WBC # BLD AUTO: 11.7 K/UL (ref 3.6–11)
WBC URNS QL MICRO: ABNORMAL /HPF (ref 0–4)

## 2022-05-20 PROCEDURE — 0502F SUBSEQUENT PRENATAL CARE: CPT | Performed by: OBSTETRICS & GYNECOLOGY

## 2022-05-20 PROCEDURE — 99285 EMERGENCY DEPT VISIT HI MDM: CPT

## 2022-05-20 PROCEDURE — 80053 COMPREHEN METABOLIC PANEL: CPT

## 2022-05-20 PROCEDURE — 36415 COLL VENOUS BLD VENIPUNCTURE: CPT

## 2022-05-20 PROCEDURE — 81001 URINALYSIS AUTO W/SCOPE: CPT

## 2022-05-20 PROCEDURE — 84156 ASSAY OF PROTEIN URINE: CPT

## 2022-05-20 PROCEDURE — 85025 COMPLETE CBC W/AUTO DIFF WBC: CPT

## 2022-05-20 RX ORDER — NIFEDIPINE 30 MG/1
30 TABLET, EXTENDED RELEASE ORAL DAILY
Status: DISCONTINUED | OUTPATIENT
Start: 2022-05-21 | End: 2022-05-20 | Stop reason: HOSPADM

## 2022-05-20 RX ORDER — NIFEDIPINE 30 MG/1
30 TABLET, EXTENDED RELEASE ORAL DAILY
Qty: 60 TABLET | Refills: 1 | Status: SHIPPED | OUTPATIENT
Start: 2022-05-20 | End: 2022-06-03

## 2022-05-20 NOTE — DISCHARGE INSTRUCTIONS
Patient Education        Counting Your Baby's Kicks: Care Instructions  Overview     Counting your baby's kicks is one way your doctor can tell that your baby is healthy. Most women--especially in a first pregnancy--feel their baby move for the first time between 16 and 22 weeks. The movement may feel like flutters rather than kicks. Your baby may move more at certain times of the day. When you are active, you may notice less kicking than when you are resting. At your prenatal visits, your doctor will ask whether the baby is active. In your last trimester, your doctor may ask you to count the number of times you feel your baby move. Follow-up care is a key part of your treatment and safety. Be sure to make and go to all appointments, and call your doctor if you are having problems. It's also a good idea to know your test results and keep a list of the medicines you take. How do you count fetal kicks? · A common method of checking your baby's movement is to note the length of time it takes to count ten movements (such as kicks, flutters, or rolls). · Pick your baby's most active time of day to count. This may be any time from morning to evening. · If you don't feel 10 movements in an hour, have something to eat or drink and count for another hour. If you don't feel at least 10 movements in the 2-hour period, call your doctor. When should you call for help? Call your doctor now or seek immediate medical care if:    · You noticed that your baby has stopped moving or is moving much less than normal.   Watch closely for changes in your health, and be sure to contact your doctor if you have any problems. Where can you learn more? Go to http://www.gray.com/  Enter [id number] in the search box to learn more about \"Counting Your Baby's Kicks: Care Instructions. \"  Current as of: June 16, 2021               Content Version: 13.2  © 6252-7658 Healthwise, [employer].    Care instructions adapted under license by [employer] (which disclaims liability or warranty for this information). If you have questions about a medical condition or this instruction, always ask your healthcare professional. [address] any warranty or liability for your use of this information.

## 2022-05-20 NOTE — PROGRESS NOTES
SUBJECTIVE:  Heena Madrid presents today for return prenatal visit. She complains of: no unusual complaints. BP elevated despite Labetlol 300mg TID. So symptoms reported by patient    Patient's medical, obstetrical, social, and family histories; medications; and lab results have been reviewed. REVIEW OF SYSTEMS: A comprehensive review of systems was negative except for that written in the HPI. 22w4d    OBJECTIVE:   Visit Vitals  BP (!) 145/101 (BP 1 Location: Left upper arm, BP Patient Position: Supine)   Pulse (!) 113   Resp 18   Ht 5' 4\" (1.626 m)   Wt 326 lb (147.9 kg)   LMP 2021 (LMP Unknown)   SpO2 99%   BMI 55.96 kg/m²      Refer to Prenatal Physical for exam findings    ASSESSMENT/PLAN:  Prenatal Education: 2nd trimester topics:  signs and symptoms of  labor and sent to LD for evaluation. Plan to start procardia 30 mg QDAy  Patient/guardian understands and agrees with the plan of care.

## 2022-05-20 NOTE — PROGRESS NOTES
1200-pt to l&d room 4 from the office of dr daly with complaints of increased bp. Per dr Brian Brizuela, pt bp in office was 162/81. Pt resting in bed. No support at bedside. No c/o headache, visual disturbances, leaking, bleeding. Received orders from dr daly. 1215-difficult to trace infant with efm. Doppler- 149-152 with fetal movement. Pt states that she is able to feel baby move as well     1410-dr drake called. Pt condition and concerns reviewed with md. Labs reviewed. Orders taken to discharge pt home. 1415-dr daly called to desk. Labs and bps reviewed with md. md aware that pt is to be discharged home. Per dr Brian Brizuela, pt has a prescription for procardia that was sent to pharmacy. Will contact dr Chuy Fenton for further advice on whether to have pt start meds or not. 1420-dr drake called to unit. vidal aware that malika has sent procardia prescription to the pharmacy. Pt to remain on current bp meds at this time. 1425-discharge instructions reviewed and signed by pt. Pt aware that she will need to keep her appt in office with dr Brian Brizuela in 2 weeks. 1430-pt discharged off unit, ambulatory.

## 2022-06-03 ENCOUNTER — ROUTINE PRENATAL (OUTPATIENT)
Dept: OBGYN CLINIC | Age: 27
End: 2022-06-03
Payer: COMMERCIAL

## 2022-06-03 VITALS
HEART RATE: 116 BPM | BODY MASS INDEX: 50.02 KG/M2 | OXYGEN SATURATION: 98 % | RESPIRATION RATE: 18 BRPM | DIASTOLIC BLOOD PRESSURE: 86 MMHG | WEIGHT: 293 LBS | HEIGHT: 64 IN | SYSTOLIC BLOOD PRESSURE: 153 MMHG

## 2022-06-03 DIAGNOSIS — Z34.90 PREGNANCY, UNSPECIFIED GESTATIONAL AGE: Primary | ICD-10-CM

## 2022-06-03 PROCEDURE — 0502F SUBSEQUENT PRENATAL CARE: CPT | Performed by: OBSTETRICS & GYNECOLOGY

## 2022-06-04 LAB
BASOPHILS # BLD AUTO: 0 X10E3/UL (ref 0–0.2)
BASOPHILS NFR BLD AUTO: 0 %
EOSINOPHIL # BLD AUTO: 0.1 X10E3/UL (ref 0–0.4)
EOSINOPHIL NFR BLD AUTO: 1 %
ERYTHROCYTE [DISTWIDTH] IN BLOOD BY AUTOMATED COUNT: 14.4 % (ref 11.7–15.4)
GLUCOSE 1H P 50 G GLC PO SERPL-MCNC: 150 MG/DL (ref 65–139)
HCT VFR BLD AUTO: 31.1 % (ref 34–46.6)
HGB BLD-MCNC: 10.5 G/DL (ref 11.1–15.9)
IMM GRANULOCYTES # BLD AUTO: 0 X10E3/UL (ref 0–0.1)
IMM GRANULOCYTES NFR BLD AUTO: 0 %
LYMPHOCYTES # BLD AUTO: 2.3 X10E3/UL (ref 0.7–3.1)
LYMPHOCYTES NFR BLD AUTO: 23 %
MCH RBC QN AUTO: 27.9 PG (ref 26.6–33)
MCHC RBC AUTO-ENTMCNC: 33.8 G/DL (ref 31.5–35.7)
MCV RBC AUTO: 83 FL (ref 79–97)
MONOCYTES # BLD AUTO: 0.5 X10E3/UL (ref 0.1–0.9)
MONOCYTES NFR BLD AUTO: 5 %
NEUTROPHILS # BLD AUTO: 7 X10E3/UL (ref 1.4–7)
NEUTROPHILS NFR BLD AUTO: 71 %
PLATELET # BLD AUTO: 260 X10E3/UL (ref 150–450)
RBC # BLD AUTO: 3.77 X10E6/UL (ref 3.77–5.28)
WBC # BLD AUTO: 9.9 X10E3/UL (ref 3.4–10.8)

## 2022-06-06 ENCOUNTER — TELEPHONE (OUTPATIENT)
Dept: OBGYN CLINIC | Age: 27
End: 2022-06-06

## 2022-06-06 NOTE — TELEPHONE ENCOUNTER
Patient notified that she failed the one hour glucose test and needs to do the 3hr gtt.   Patient aware to fast before test.

## 2022-06-09 NOTE — PATIENT INSTRUCTIONS
Counting Your Baby's Kicks: Care Instructions  Overview     Counting your baby's kicks is one way your doctor can tell that your baby is healthy. Most womenespecially in a first pregnancyfeel their baby move for the first time between 16 and 22 weeks. The movement may feel like flutters rather than kicks. Your baby may move more at certain times of the day. When you are active, you may notice less kicking than when you are resting. At your prenatal visits, your doctor will ask whether the baby is active. In your last trimester, your doctor may ask you to count the number of times you feel your baby move. Follow-up care is a key part of your treatment and safety. Be sure to make and go to all appointments, and call your doctor if you are having problems. It's also a good idea to know your test results and keep a list of the medicines you take. How do you count fetal kicks? · A common method of checking your baby's movement is to note the length of time it takes to count ten movements (such as kicks, flutters, or rolls). · Pick your baby's most active time of day to count. This may be any time from morning to evening. · If you don't feel 10 movements in an hour, have something to eat or drink and count for another hour. If you don't feel at least 10 movements in the 2-hour period, call your doctor. When should you call for help? Call your doctor now or seek immediate medical care if:    · You noticed that your baby has stopped moving or is moving much less than normal.   Watch closely for changes in your health, and be sure to contact your doctor if you have any problems. Where can you learn more? Go to http://www.gray.com/  Enter [id number] in the search box to learn more about \"Counting Your Baby's Kicks: Care Instructions. \"  Current as of: June 16, 2021               Content Version: 13.2  © 5475-1677 Healthwise, [employer].    Care instructions adapted under license by [employer] (which disclaims liability or warranty for this information). If you have questions about a medical condition or this instruction, always ask your healthcare professional. [address] any warranty or liability for your use of this information.

## 2022-06-15 DIAGNOSIS — R73.09 ABNORMAL GLUCOSE TOLERANCE TEST (GTT): Primary | ICD-10-CM

## 2022-06-16 LAB
GLUCOSE 1H P 100 G GLC PO SERPL-MCNC: 130 MG/DL (ref 65–179)
GLUCOSE 2H P 100 G GLC PO SERPL-MCNC: 117 MG/DL (ref 65–154)
GLUCOSE 3H P 100 G GLC PO SERPL-MCNC: 102 MG/DL (ref 65–139)
GLUCOSE P FAST SERPL-MCNC: 77 MG/DL (ref 65–94)
NOTE:, 102047: NORMAL

## 2022-06-29 ENCOUNTER — ROUTINE PRENATAL (OUTPATIENT)
Dept: OBGYN CLINIC | Age: 27
End: 2022-06-29
Payer: COMMERCIAL

## 2022-06-29 VITALS
BODY MASS INDEX: 50.02 KG/M2 | HEART RATE: 107 BPM | HEIGHT: 64 IN | DIASTOLIC BLOOD PRESSURE: 82 MMHG | WEIGHT: 293 LBS | OXYGEN SATURATION: 97 % | RESPIRATION RATE: 16 BRPM | SYSTOLIC BLOOD PRESSURE: 138 MMHG

## 2022-06-29 DIAGNOSIS — O99.210 MATERNAL OBESITY AFFECTING PREGNANCY, ANTEPARTUM: ICD-10-CM

## 2022-06-29 DIAGNOSIS — Z14.1 CYSTIC FIBROSIS CARRIER: ICD-10-CM

## 2022-06-29 DIAGNOSIS — Z34.90 PREGNANCY, UNSPECIFIED GESTATIONAL AGE: Primary | ICD-10-CM

## 2022-06-29 DIAGNOSIS — O16.9 HYPERTENSION AFFECTING PREGNANCY, ANTEPARTUM: ICD-10-CM

## 2022-06-29 PROCEDURE — 0502F SUBSEQUENT PRENATAL CARE: CPT | Performed by: OBSTETRICS & GYNECOLOGY

## 2022-06-29 NOTE — PROGRESS NOTES
SUBJECTIVE:  Dequan Adhikari presents today for return prenatal visit. She complains of: no unusual complaints. M appt on the 15th . Good fetal movement    Patient's medical, obstetrical, social, and family histories; medications; and lab results have been reviewed. REVIEW OF SYSTEMS: A comprehensive review of systems was negative except for that written in the HPI.    28w2d    OBJECTIVE:   Visit Vitals  /82 (BP 1 Location: Right arm, BP Patient Position: Sitting)   Pulse (!) 107   Resp 16   Ht 5' 4\" (1.626 m)   Wt 331 lb (150.1 kg)   LMP 12/03/2021 (LMP Unknown)   SpO2 97%   BMI 56.82 kg/m²      Refer to Prenatal Physical for exam findings    ASSESSMENT/PLAN:  Prenatal Education: 3rd trimester topics:  fetal movement monitoring, pre-term labor, signs of labor- when to go to the hospital & transportation and signs and symptoms of PIH  Patient/guardian understands and agrees with the plan of care.

## 2022-06-29 NOTE — PATIENT INSTRUCTIONS
Counting Your Baby's Kicks: Care Instructions  Overview     Counting your baby's kicks is one way your doctor can tell that your baby is healthy. Most women--especially in a first pregnancy--feel their baby move for the first time between 16 and 22 weeks. The movement may feel like flutters rather than kicks. Your baby may move more at certain times of the day. When you are active, you may notice less kicking than when you are resting. At your prenatal visits, your doctor will ask whether the baby is active. In your last trimester, your doctor may ask you to count the number of times you feel your baby move. Follow-up care is a key part of your treatment and safety. Be sure to make and go to all appointments, and call your doctor if you are having problems. It's also a good idea to know your test results and keep a list of the medicines you take. How do you count fetal kicks? · A common method of checking your baby's movement is to note the length of time it takes to count ten movements (such as kicks, flutters, or rolls). · Pick your baby's most active time of day to count. This may be any time from morning to evening. · If you don't feel 10 movements in an hour, have something to eat or drink and count for another hour. If you don't feel at least 10 movements in the 2-hour period, call your doctor. When should you call for help? Call your doctor now or seek immediate medical care if:    · You noticed that your baby has stopped moving or is moving much less than normal.   Watch closely for changes in your health, and be sure to contact your doctor if you have any problems. Where can you learn more? Go to http://www.gray.com/  Enter [id number] in the search box to learn more about \"Counting Your Baby's Kicks: Care Instructions. \"  Current as of: June 16, 2021               Content Version: 13.2  © 9523-2849 [employer].    Care instructions adapted under license by [employer] (which disclaims liability or warranty for this information). If you have questions about a medical condition or this instruction, always ask your healthcare professional. [address] any warranty or liability for your use of this information.

## 2022-07-14 ENCOUNTER — ROUTINE PRENATAL (OUTPATIENT)
Dept: OBGYN CLINIC | Age: 27
End: 2022-07-14
Payer: COMMERCIAL

## 2022-07-14 VITALS
HEIGHT: 64 IN | SYSTOLIC BLOOD PRESSURE: 118 MMHG | WEIGHT: 293 LBS | BODY MASS INDEX: 50.02 KG/M2 | DIASTOLIC BLOOD PRESSURE: 74 MMHG

## 2022-07-14 DIAGNOSIS — Z34.90 PREGNANCY, UNSPECIFIED GESTATIONAL AGE: Primary | ICD-10-CM

## 2022-07-14 DIAGNOSIS — O16.9 HYPERTENSION AFFECTING PREGNANCY, ANTEPARTUM: ICD-10-CM

## 2022-07-14 DIAGNOSIS — Z3A.30 30 WEEKS GESTATION OF PREGNANCY: ICD-10-CM

## 2022-07-14 DIAGNOSIS — O99.210 MATERNAL OBESITY AFFECTING PREGNANCY, ANTEPARTUM: ICD-10-CM

## 2022-07-14 DIAGNOSIS — Z14.1 CYSTIC FIBROSIS CARRIER: ICD-10-CM

## 2022-07-14 PROCEDURE — 0502F SUBSEQUENT PRENATAL CARE: CPT | Performed by: OBSTETRICS & GYNECOLOGY

## 2022-07-14 NOTE — PATIENT INSTRUCTIONS
Counting Your Baby's Kicks: Care Instructions  Overview     Counting your baby's kicks is one way your doctor can tell that your baby is healthy. Most women--especially in a first pregnancy--feel their baby move for the first time between 16 and 22 weeks. The movement may feel like flutters rather than kicks. Your baby may move more at certain times of the day. When you are active, you may notice less kicking than when you are resting. At your prenatal visits, your doctor will ask whether the baby is active. In your last trimester, your doctor may ask you to count the number of times you feel your baby move. Follow-up care is a key part of your treatment and safety. Be sure to make and go to all appointments, and call your doctor if you are having problems. It's also a good idea to know your test results and keep a list of the medicines you take. How do you count fetal kicks? · A common method of checking your baby's movement is to note the length of time it takes to count ten movements (such as kicks, flutters, or rolls). · Pick your baby's most active time of day to count. This may be any time from morning to evening. · If you don't feel 10 movements in an hour, have something to eat or drink and count for another hour. If you don't feel at least 10 movements in the 2-hour period, call your doctor. When should you call for help? Call your doctor now or seek immediate medical care if:    · You noticed that your baby has stopped moving or is moving much less than normal.   Watch closely for changes in your health, and be sure to contact your doctor if you have any problems. Where can you learn more? Go to http://www.gray.com/  Enter [id number] in the search box to learn more about \"Counting Your Baby's Kicks: Care Instructions. \"  Current as of: June 16, 2021               Content Version: 13.2  © 8577-4241 Healthwise, [employer].    Care instructions adapted under license by [employer] (which disclaims liability or warranty for this information). If you have questions about a medical condition or this instruction, always ask your healthcare professional. [address] any warranty or liability for your use of this information.

## 2022-07-14 NOTE — PROGRESS NOTES
SUBJECTIVE:  Art Tellez presents today for return prenatal visit. She complains of: no unusual complaints. Patient's medical, obstetrical, social, and family histories; medications; and lab results have been reviewed. REVIEW OF SYSTEMS: A comprehensive review of systems was negative except for that written in the HPI. 30w3d    OBJECTIVE:   Visit Vitals  /74 (BP 1 Location: Left upper arm, BP Patient Position: Sitting, BP Cuff Size: Large adult)   Ht 5' 4\" (1.626 m)   Wt 334 lb 6 oz (151.7 kg)   LMP 12/03/2021 (LMP Unknown)   BMI 57.40 kg/m²      Refer to Prenatal Physical for exam findings    ASSESSMENT/PLAN:  Prenatal Education: 3rd trimester topics:  fetal movement monitoring, pre-term labor, signs of labor- when to go to the hospital & transportation and signs and symptoms of PIH, MFM appt tomorrow  Patient/guardian understands and agrees with the plan of care.

## 2022-07-14 NOTE — PROGRESS NOTES
Chief Complaint   Patient presents with    Routine Prenatal Visit     Visit Vitals  /74 (BP 1 Location: Left upper arm, BP Patient Position: Sitting, BP Cuff Size: Large adult)   Ht 5' 4\" (1.626 m)   Wt 334 lb 6 oz (151.7 kg)   LMP 12/03/2021 (LMP Unknown)   BMI 57.40 kg/m²     No current complaints.

## 2022-07-25 ENCOUNTER — ROUTINE PRENATAL (OUTPATIENT)
Dept: OBGYN CLINIC | Age: 27
End: 2022-07-25
Payer: COMMERCIAL

## 2022-07-25 VITALS
WEIGHT: 293 LBS | HEART RATE: 108 BPM | SYSTOLIC BLOOD PRESSURE: 111 MMHG | RESPIRATION RATE: 18 BRPM | HEIGHT: 64 IN | BODY MASS INDEX: 50.02 KG/M2 | DIASTOLIC BLOOD PRESSURE: 69 MMHG | OXYGEN SATURATION: 97 %

## 2022-07-25 DIAGNOSIS — Z14.1 CYSTIC FIBROSIS CARRIER: ICD-10-CM

## 2022-07-25 DIAGNOSIS — Z34.90 PREGNANCY, UNSPECIFIED GESTATIONAL AGE: Primary | ICD-10-CM

## 2022-07-25 DIAGNOSIS — O99.210 MATERNAL OBESITY AFFECTING PREGNANCY, ANTEPARTUM: ICD-10-CM

## 2022-07-25 DIAGNOSIS — O16.9 HYPERTENSION AFFECTING PREGNANCY, ANTEPARTUM: ICD-10-CM

## 2022-07-25 DIAGNOSIS — Z3A.32 32 WEEKS GESTATION OF PREGNANCY: ICD-10-CM

## 2022-07-25 PROCEDURE — 0502F SUBSEQUENT PRENATAL CARE: CPT | Performed by: OBSTETRICS & GYNECOLOGY

## 2022-07-29 NOTE — PATIENT INSTRUCTIONS
Counting Your Baby's Kicks: Care Instructions  Overview     Counting your baby's kicks is one way your doctor can tell that your baby is healthy. Most women--especially in a first pregnancy--feel their baby move for the first time between 16 and 22 weeks. The movement may feel like flutters rather than kicks. Your baby may move more at certain times of the day. When you are active, you may notice less kicking than when you are resting. At your prenatal visits, your doctor will ask whether the baby is active. In your last trimester, your doctor may ask you to count the number of times you feel your baby move. Follow-up care is a key part of your treatment and safety. Be sure to make and go to all appointments, and call your doctor if you are having problems. It's also a good idea to know your test results and keep a list of the medicines you take. How do you count fetal kicks? A common method of checking your baby's movement is to note the length of time it takes to count ten movements (such as kicks, flutters, or rolls). Pick your baby's most active time of day to count. This may be any time from morning to evening. If you don't feel 10 movements in an hour, have something to eat or drink and count for another hour. If you don't feel at least 10 movements in the 2-hour period, call your doctor. When should you call for help? Call your doctor now or seek immediate medical care if:    You noticed that your baby has stopped moving or is moving much less than normal.   Watch closely for changes in your health, and be sure to contact your doctor if you have any problems. Where can you learn more? Go to http://www.gray.com/  Enter [id number] in the search box to learn more about \"Counting Your Baby's Kicks: Care Instructions. \"  Current as of: June 16, 2021               Content Version: 13.2  © 3526-9003 Healthwise, [employer].    Care instructions adapted under license by [employer] (which disclaims liability or warranty for this information). If you have questions about a medical condition or this instruction, always ask your healthcare professional. [address] any warranty or liability for your use of this information.

## 2022-08-04 ENCOUNTER — TELEPHONE (OUTPATIENT)
Dept: OBGYN CLINIC | Age: 27
End: 2022-08-04

## 2022-08-04 NOTE — PATIENT INSTRUCTIONS
Counting Your Baby's Kicks: Care Instructions  Overview     Counting your baby's kicks is one way your doctor can tell that your baby is healthy. Most womenespecially in a first pregnancyfeel their baby move for the first time between 16 and 22 weeks. The movement may feel like flutters rather than kicks. Your baby may move more at certain times of the day. When you are active, you may notice less kicking than when you are resting. At your prenatal visits, your doctor will ask whether the baby is active. In your last trimester, your doctor may ask you to count the number of times you feel your baby move. Follow-up care is a key part of your treatment and safety. Be sure to make and go to all appointments, and call your doctor if you are having problems. It's also a good idea to know your test results and keep a list of the medicines you take. How do you count fetal kicks? · A common method of checking your baby's movement is to note the length of time it takes to count ten movements (such as kicks, flutters, or rolls). · Pick your baby's most active time of day to count. This may be any time from morning to evening. · If you don't feel 10 movements in an hour, have something to eat or drink and count for another hour. If you don't feel at least 10 movements in the 2-hour period, call your doctor. When should you call for help? Call your doctor now or seek immediate medical care if:    · You noticed that your baby has stopped moving or is moving much less than normal.   Watch closely for changes in your health, and be sure to contact your doctor if you have any problems. Where can you learn more? Go to http://www.gray.com/  Enter [id number] in the search box to learn more about \"Counting Your Baby's Kicks: Care Instructions. \"  Current as of: June 16, 2021               Content Version: 13.2  © 1035-5039 Healthwise, [employer].    Care instructions adapted under license by [employer] Kelsey Pemberton is here for an xray and wants a covid test. Can you please put one in her chart? Thanks! [employer] (which disclaims liability or warranty for this information). If you have questions about a medical condition or this instruction, always ask your healthcare professional. [address] any warranty or liability for your use of this information.

## 2022-08-04 NOTE — TELEPHONE ENCOUNTER
Patient called stating she tested positive for Covid this morning. Her appointment scheduled on 08/09/22 has been rescheduled to 08/15/22.

## 2022-08-12 ENCOUNTER — HOSPITAL ENCOUNTER (OUTPATIENT)
Age: 27
Discharge: HOME | End: 2022-08-12
Attending: OBSTETRICS & GYNECOLOGY | Admitting: OBSTETRICS & GYNECOLOGY
Payer: COMMERCIAL

## 2022-08-12 VITALS
OXYGEN SATURATION: 97 % | TEMPERATURE: 99.5 F | HEIGHT: 64 IN | HEART RATE: 124 BPM | WEIGHT: 293 LBS | BODY MASS INDEX: 50.02 KG/M2 | DIASTOLIC BLOOD PRESSURE: 66 MMHG | RESPIRATION RATE: 18 BRPM | SYSTOLIC BLOOD PRESSURE: 135 MMHG

## 2022-08-12 LAB
ALBUMIN SERPL-MCNC: 2.6 G/DL (ref 3.5–5)
ALBUMIN/GLOB SERPL: 0.6 {RATIO} (ref 1.1–2.2)
ALP SERPL-CCNC: 67 U/L (ref 45–117)
ALT SERPL-CCNC: 22 U/L (ref 12–78)
ANION GAP SERPL CALC-SCNC: 8 MMOL/L (ref 5–15)
APPEARANCE UR: ABNORMAL
AST SERPL W P-5'-P-CCNC: 18 U/L (ref 15–37)
BACTERIA URNS QL MICRO: NEGATIVE /HPF
BASOPHILS # BLD: 0 K/UL (ref 0–0.1)
BASOPHILS NFR BLD: 0 % (ref 0–1)
BILIRUB SERPL-MCNC: 0.2 MG/DL (ref 0.2–1)
BILIRUB UR QL: NEGATIVE
BUN SERPL-MCNC: 10 MG/DL (ref 6–20)
BUN/CREAT SERPL: 18 (ref 12–20)
CA-I BLD-MCNC: 8.9 MG/DL (ref 8.5–10.1)
CHLORIDE SERPL-SCNC: 110 MMOL/L (ref 97–108)
CO2 SERPL-SCNC: 21 MMOL/L (ref 21–32)
COLOR UR: ABNORMAL
CREAT SERPL-MCNC: 0.57 MG/DL (ref 0.55–1.02)
CREAT UR-MCNC: 211 MG/DL
DIFFERENTIAL METHOD BLD: ABNORMAL
EOSINOPHIL # BLD: 0.1 K/UL (ref 0–0.4)
EOSINOPHIL NFR BLD: 1 % (ref 0–7)
ERYTHROCYTE [DISTWIDTH] IN BLOOD BY AUTOMATED COUNT: 15.1 % (ref 11.5–14.5)
GLOBULIN SER CALC-MCNC: 4.1 G/DL (ref 2–4)
GLUCOSE SERPL-MCNC: 75 MG/DL (ref 65–100)
GLUCOSE UR STRIP.AUTO-MCNC: NORMAL MG/DL
HCT VFR BLD AUTO: 32.7 % (ref 35–47)
HGB BLD-MCNC: 10.7 G/DL (ref 11.5–16)
HGB UR QL STRIP: NEGATIVE
IMM GRANULOCYTES # BLD AUTO: 0 K/UL (ref 0–0.04)
IMM GRANULOCYTES NFR BLD AUTO: 0 % (ref 0–0.5)
KETONES UR QL STRIP.AUTO: NEGATIVE MG/DL
LDH SERPL L TO P-CCNC: 154 U/L (ref 81–246)
LEUKOCYTE ESTERASE UR QL STRIP.AUTO: 75
LYMPHOCYTES # BLD: 2.7 K/UL (ref 0.8–3.5)
LYMPHOCYTES NFR BLD: 26 % (ref 12–49)
MCH RBC QN AUTO: 27.6 PG (ref 26–34)
MCHC RBC AUTO-ENTMCNC: 32.7 G/DL (ref 30–36.5)
MCV RBC AUTO: 84.5 FL (ref 80–99)
MONOCYTES # BLD: 0.7 K/UL (ref 0–1)
MONOCYTES NFR BLD: 7 % (ref 5–13)
MUCOUS THREADS URNS QL MICRO: ABNORMAL /LPF
NEUTS SEG # BLD: 6.8 K/UL (ref 1.8–8)
NEUTS SEG NFR BLD: 66 % (ref 32–75)
NITRITE UR QL STRIP.AUTO: NEGATIVE
NRBC # BLD: 0 K/UL (ref 0–0.01)
NRBC BLD-RTO: 0 PER 100 WBC
PH UR STRIP: 5 [PH] (ref 5–8)
PLATELET # BLD AUTO: 305 K/UL (ref 150–400)
PMV BLD AUTO: 11.7 FL (ref 8.9–12.9)
POTASSIUM SERPL-SCNC: 4.3 MMOL/L (ref 3.5–5.1)
PROT SERPL-MCNC: 6.7 G/DL (ref 6.4–8.2)
PROT UR STRIP-MCNC: ABNORMAL MG/DL
PROT UR-MCNC: 34 MG/DL (ref 0–11.9)
PROT/CREAT UR-RTO: 0.2
RBC # BLD AUTO: 3.87 M/UL (ref 3.8–5.2)
RBC #/AREA URNS HPF: 9 /HPF (ref 0–5)
SODIUM SERPL-SCNC: 139 MMOL/L (ref 136–145)
SP GR UR REFRACTOMETRY: 1.02 (ref 1–1.03)
SP GR UR REFRACTOMETRY: 1.02 (ref 1–1.03)
UA: UC IF INDICATED,UAUC: ABNORMAL
UROBILINOGEN UR QL STRIP.AUTO: 1 EU/DL (ref 0.1–1)
WBC # BLD AUTO: 10.3 K/UL (ref 3.6–11)
WBC URNS QL MICRO: 12 /HPF (ref 0–4)

## 2022-08-12 PROCEDURE — 87086 URINE CULTURE/COLONY COUNT: CPT

## 2022-08-12 PROCEDURE — 36415 COLL VENOUS BLD VENIPUNCTURE: CPT

## 2022-08-12 PROCEDURE — 99285 EMERGENCY DEPT VISIT HI MDM: CPT

## 2022-08-12 PROCEDURE — 81001 URINALYSIS AUTO W/SCOPE: CPT

## 2022-08-12 PROCEDURE — 74011250637 HC RX REV CODE- 250/637: Performed by: OBSTETRICS & GYNECOLOGY

## 2022-08-12 PROCEDURE — 84156 ASSAY OF PROTEIN URINE: CPT

## 2022-08-12 PROCEDURE — 80053 COMPREHEN METABOLIC PANEL: CPT

## 2022-08-12 PROCEDURE — 83615 LACTATE (LD) (LDH) ENZYME: CPT

## 2022-08-12 PROCEDURE — 85025 COMPLETE CBC W/AUTO DIFF WBC: CPT

## 2022-08-12 RX ORDER — GRANULES FOR ORAL 3 G/1
3 POWDER ORAL ONCE
Status: COMPLETED | OUTPATIENT
Start: 2022-08-12 | End: 2022-08-12

## 2022-08-12 RX ADMIN — GRANULES FOR ORAL SOLUTION 3 G: 3 POWDER ORAL at 19:47

## 2022-08-12 NOTE — ROUTINE PROCESS
1900: bedside report received from AURORA Bates RN. Pt resting comfortably in bed with family member at bedside. Pt reports no pain at this time. EFM adjusted. 2015: D/C order received from Dr. Peter Tinoco. IV removed from pt at this time and D/C instructions reviewed with pt. Pt states understanding of instructions and stated devora further questions at this time.

## 2022-08-12 NOTE — PROGRESS NOTES
PT arrives from [facility] office after serial high pressures in office. MD informed of pt arrival orders received.

## 2022-08-13 NOTE — PROGRESS NOTES
1940 Lab results reviewed with Dr Kelsey Marques via phone. Urinalysis suspicious for possible UTI, order for Monurol received. Preeclampsia workup negative, BP reading recited back to provider- WNL, Dr Kelsey Marques aware efm extremely difficult to trace due to anterior placenta, Several minutes of reactive efm obtained during stay. Pt endorses fetal movement, denies headache, nausea, or pain. Orders for discharge received. 1943 Pt aware of monurol order and discharge order. Pt okay with going home and following up with Dr. Yaya Patel Monday. 1955 Several minutes of attempts made to trace efm, fetus can be heard moving, and mother endorses fetal movement.

## 2022-08-14 LAB
BACTERIA SPEC CULT: NORMAL
COLONY COUNT,CNT: NORMAL
COLONY COUNT,CNT: NORMAL
SPECIAL REQUESTS,SREQ: NORMAL

## 2022-08-15 ENCOUNTER — HOSPITAL ENCOUNTER (OUTPATIENT)
Age: 27
Discharge: HOME | End: 2022-08-15
Attending: OBSTETRICS & GYNECOLOGY | Admitting: OBSTETRICS & GYNECOLOGY
Payer: COMMERCIAL

## 2022-08-15 ENCOUNTER — ROUTINE PRENATAL (OUTPATIENT)
Dept: OBGYN CLINIC | Age: 27
End: 2022-08-15
Payer: COMMERCIAL

## 2022-08-15 VITALS
OXYGEN SATURATION: 99 % | HEART RATE: 133 BPM | HEIGHT: 64 IN | SYSTOLIC BLOOD PRESSURE: 147 MMHG | RESPIRATION RATE: 20 BRPM | BODY MASS INDEX: 57.85 KG/M2 | DIASTOLIC BLOOD PRESSURE: 85 MMHG | TEMPERATURE: 98.8 F

## 2022-08-15 VITALS
HEIGHT: 64 IN | SYSTOLIC BLOOD PRESSURE: 170 MMHG | DIASTOLIC BLOOD PRESSURE: 108 MMHG | BODY MASS INDEX: 50.02 KG/M2 | WEIGHT: 293 LBS

## 2022-08-15 DIAGNOSIS — Z3A.35 35 WEEKS GESTATION OF PREGNANCY: ICD-10-CM

## 2022-08-15 DIAGNOSIS — O99.210 MATERNAL OBESITY AFFECTING PREGNANCY, ANTEPARTUM: ICD-10-CM

## 2022-08-15 DIAGNOSIS — O16.9 HYPERTENSION AFFECTING PREGNANCY, ANTEPARTUM: ICD-10-CM

## 2022-08-15 DIAGNOSIS — Z34.90 PREGNANCY, UNSPECIFIED GESTATIONAL AGE: Primary | ICD-10-CM

## 2022-08-15 LAB
ALBUMIN SERPL-MCNC: 2.6 G/DL (ref 3.5–5)
ALBUMIN/GLOB SERPL: 0.6 {RATIO} (ref 1.1–2.2)
ALP SERPL-CCNC: 71 U/L (ref 45–117)
ALT SERPL-CCNC: 20 U/L (ref 12–78)
ANION GAP SERPL CALC-SCNC: 7 MMOL/L (ref 5–15)
APPEARANCE UR: ABNORMAL
AST SERPL W P-5'-P-CCNC: 12 U/L (ref 15–37)
BACTERIA URNS QL MICRO: NEGATIVE /HPF
BASOPHILS # BLD: 0 K/UL (ref 0–0.1)
BASOPHILS NFR BLD: 0 % (ref 0–1)
BILIRUB SERPL-MCNC: 0.2 MG/DL (ref 0.2–1)
BILIRUB UR QL: NEGATIVE
BUN SERPL-MCNC: 9 MG/DL (ref 6–20)
BUN/CREAT SERPL: 17 (ref 12–20)
CA-I BLD-MCNC: 9.6 MG/DL (ref 8.5–10.1)
CHLORIDE SERPL-SCNC: 108 MMOL/L (ref 97–108)
CO2 SERPL-SCNC: 21 MMOL/L (ref 21–32)
COLOR UR: YELLOW
CREAT SERPL-MCNC: 0.53 MG/DL (ref 0.55–1.02)
CREAT UR-MCNC: 154 MG/DL
DIFFERENTIAL METHOD BLD: ABNORMAL
EOSINOPHIL # BLD: 0.1 K/UL (ref 0–0.4)
EOSINOPHIL NFR BLD: 1 % (ref 0–7)
ERYTHROCYTE [DISTWIDTH] IN BLOOD BY AUTOMATED COUNT: 14.8 % (ref 11.5–14.5)
GLOBULIN SER CALC-MCNC: 4.2 G/DL (ref 2–4)
GLUCOSE SERPL-MCNC: 89 MG/DL (ref 65–100)
GLUCOSE UR STRIP.AUTO-MCNC: NEGATIVE MG/DL
HCT VFR BLD AUTO: 33.1 % (ref 35–47)
HGB BLD-MCNC: 11.2 G/DL (ref 11.5–16)
HGB UR QL STRIP: ABNORMAL
IMM GRANULOCYTES # BLD AUTO: 0 K/UL (ref 0–0.04)
IMM GRANULOCYTES NFR BLD AUTO: 0 % (ref 0–0.5)
KETONES UR QL STRIP.AUTO: NEGATIVE MG/DL
LEUKOCYTE ESTERASE UR QL STRIP.AUTO: NEGATIVE
LYMPHOCYTES # BLD: 2.2 K/UL (ref 0.8–3.5)
LYMPHOCYTES NFR BLD: 23 % (ref 12–49)
MCH RBC QN AUTO: 28.1 PG (ref 26–34)
MCHC RBC AUTO-ENTMCNC: 33.8 G/DL (ref 30–36.5)
MCV RBC AUTO: 83.2 FL (ref 80–99)
MONOCYTES # BLD: 0.6 K/UL (ref 0–1)
MONOCYTES NFR BLD: 7 % (ref 5–13)
MUCOUS THREADS URNS QL MICRO: ABNORMAL /LPF
NEUTS SEG # BLD: 6.5 K/UL (ref 1.8–8)
NEUTS SEG NFR BLD: 69 % (ref 32–75)
NITRITE UR QL STRIP.AUTO: NEGATIVE
NRBC # BLD: 0 K/UL (ref 0–0.01)
NRBC BLD-RTO: 0 PER 100 WBC
PH UR STRIP: 5 [PH] (ref 5–8)
PLATELET # BLD AUTO: 268 K/UL (ref 150–400)
PMV BLD AUTO: 11.3 FL (ref 8.9–12.9)
POTASSIUM SERPL-SCNC: 4.4 MMOL/L (ref 3.5–5.1)
PROT SERPL-MCNC: 6.8 G/DL (ref 6.4–8.2)
PROT UR STRIP-MCNC: NEGATIVE MG/DL
PROT UR-MCNC: 32 MG/DL (ref 0–11.9)
PROT/CREAT UR-RTO: 0.2
RBC # BLD AUTO: 3.98 M/UL (ref 3.8–5.2)
RBC #/AREA URNS HPF: ABNORMAL /HPF (ref 0–5)
SODIUM SERPL-SCNC: 136 MMOL/L (ref 136–145)
SP GR UR REFRACTOMETRY: 1.02 (ref 1–1.03)
URATE SERPL-MCNC: 6.1 MG/DL (ref 2.6–6)
UROBILINOGEN UR QL STRIP.AUTO: 0.1 EU/DL (ref 0.1–1)
WBC # BLD AUTO: 9.4 K/UL (ref 3.6–11)
WBC URNS QL MICRO: ABNORMAL /HPF (ref 0–4)

## 2022-08-15 PROCEDURE — 74011250637 HC RX REV CODE- 250/637: Performed by: OBSTETRICS & GYNECOLOGY

## 2022-08-15 PROCEDURE — 84156 ASSAY OF PROTEIN URINE: CPT

## 2022-08-15 PROCEDURE — 80053 COMPREHEN METABOLIC PANEL: CPT

## 2022-08-15 PROCEDURE — 85025 COMPLETE CBC W/AUTO DIFF WBC: CPT

## 2022-08-15 PROCEDURE — 99285 EMERGENCY DEPT VISIT HI MDM: CPT

## 2022-08-15 PROCEDURE — 0502F SUBSEQUENT PRENATAL CARE: CPT | Performed by: OBSTETRICS & GYNECOLOGY

## 2022-08-15 PROCEDURE — 84550 ASSAY OF BLOOD/URIC ACID: CPT

## 2022-08-15 PROCEDURE — 96372 THER/PROPH/DIAG INJ SC/IM: CPT

## 2022-08-15 PROCEDURE — 81001 URINALYSIS AUTO W/SCOPE: CPT

## 2022-08-15 PROCEDURE — 36415 COLL VENOUS BLD VENIPUNCTURE: CPT

## 2022-08-15 PROCEDURE — 74011250636 HC RX REV CODE- 250/636: Performed by: OBSTETRICS & GYNECOLOGY

## 2022-08-15 PROCEDURE — 99214 OFFICE O/P EST MOD 30 MIN: CPT | Performed by: OBSTETRICS & GYNECOLOGY

## 2022-08-15 RX ORDER — LABETALOL 300 MG/1
300 TABLET, FILM COATED ORAL 3 TIMES DAILY
Qty: 30 TABLET | Refills: 1 | Status: SHIPPED | OUTPATIENT
Start: 2022-08-15 | End: 2022-08-23

## 2022-08-15 RX ORDER — NIFEDIPINE 30 MG/1
30 TABLET, EXTENDED RELEASE ORAL
Status: COMPLETED | OUTPATIENT
Start: 2022-08-15 | End: 2022-08-15

## 2022-08-15 RX ORDER — BETAMETHASONE SODIUM PHOSPHATE AND BETAMETHASONE ACETATE 3; 3 MG/ML; MG/ML
12 INJECTION, SUSPENSION INTRA-ARTICULAR; INTRALESIONAL; INTRAMUSCULAR; SOFT TISSUE EVERY 24 HOURS
Status: DISCONTINUED | OUTPATIENT
Start: 2022-08-15 | End: 2022-08-15 | Stop reason: HOSPADM

## 2022-08-15 RX ORDER — NIFEDIPINE 30 MG/1
60 TABLET, EXTENDED RELEASE ORAL DAILY
Status: DISCONTINUED | OUTPATIENT
Start: 2022-08-15 | End: 2022-08-15 | Stop reason: HOSPADM

## 2022-08-15 RX ORDER — ZOLPIDEM TARTRATE 5 MG/1
5 TABLET ORAL
Status: DISCONTINUED | OUTPATIENT
Start: 2022-08-15 | End: 2022-08-15 | Stop reason: HOSPADM

## 2022-08-15 RX ORDER — NIFEDIPINE 90 MG/1
90 TABLET, EXTENDED RELEASE ORAL DAILY
Qty: 10 TABLET | Refills: 1 | Status: SHIPPED | OUTPATIENT
Start: 2022-08-15 | End: 2022-08-23

## 2022-08-15 RX ADMIN — LABETALOL HYDROCHLORIDE 300 MG: 200 TABLET, FILM COATED ORAL at 20:05

## 2022-08-15 RX ADMIN — NIFEDIPINE 30 MG: 30 TABLET, FILM COATED, EXTENDED RELEASE ORAL at 17:37

## 2022-08-15 RX ADMIN — NIFEDIPINE 60 MG: 30 TABLET, FILM COATED, EXTENDED RELEASE ORAL at 11:50

## 2022-08-15 RX ADMIN — LABETALOL HYDROCHLORIDE 300 MG: 200 TABLET, FILM COATED ORAL at 12:48

## 2022-08-15 RX ADMIN — BETAMETHASONE SODIUM PHOSPHATE AND BETAMETHASONE ACETATE 12 MG: 3; 3 INJECTION, SUSPENSION INTRA-ARTICULAR; INTRALESIONAL; INTRAMUSCULAR; SOFT TISSUE at 10:56

## 2022-08-15 NOTE — PROGRESS NOTES
1905: bedside shift report received from AURORA Matute RN    0467: Dr. Maury Roche at the bedside discussing POC with patient. Pt verbalized understanding, no questions asked at this time. 2018: discharge instructions went over with patient,  signs and symptoms to look out for reviewed with patient and when to either call the doctor or return to the hospital. Pt also instructed to return to the hospital tomorrow at 1100 to receive her 2nd dose of betamethasone. Pt verbalized understanding and no questions asked at this time. Written copy of discharge instructions given to patient    2019: pt discharged via ambulatory by choice.  Pt in no apparent sign of distress Problem: Potential for Falls  Goal: Patient will remain free of falls  Description: INTERVENTIONS:  - Assess patient frequently for physical needs  -  Identify cognitive and physical deficits and behaviors that affect risk of falls  -  [place] fall precautions as indicated by assessment   - Educate patient/family on patient safety including physical limitations  - Instruct patient to call for assistance with activity based on assessment  - Modify environment to reduce risk of injury  - Consider OT/PT consult to assist with strengthening/mobility  Outcome: Progressing     Problem: Prexisting or High Potential for Compromised Skin Integrity  Goal: Skin integrity is maintained or improved  Description: INTERVENTIONS:  - Identify patients at risk for skin breakdown  - Assess and monitor skin integrity  - Assess and monitor nutrition and hydration status  - Monitor labs   - Assess for incontinence   - Turn and reposition patient  - Assist with mobility/ambulation  - Relieve pressure over bony prominences  - Avoid friction and shearing  - Provide appropriate hygiene as needed including keeping skin clean and dry  - Evaluate need for skin moisturizer/barrier cream  - Collaborate with interdisciplinary team   - Patient/family teaching  - Consider wound care consult   Outcome: Progressing     Problem: Nutrition/Hydration-ADULT  Goal: Nutrient/Hydration intake appropriate for improving, restoring or maintaining nutritional needs  Description: Monitor and assess patient's nutrition/hydration status for malnutrition  Collaborate with interdisciplinary team and initiate plan and interventions as ordered  Monitor patient's weight and dietary intake as ordered or per policy  Utilize nutrition screening tool and intervene as necessary  Determine patient's food preferences and provide high-protein, high-caloric foods as appropriate       INTERVENTIONS:  - Monitor oral intake, urinary output, labs, and treatment plans  - Assess nutrition and hydration status and recommend course of action  - Evaluate amount of meals eaten  - Assist patient with eating if necessary   - Allow adequate time for meals  - Recommend/ encourage appropriate diets, oral nutritional supplements, and vitamin/mineral supplements  - Order, calculate, and assess calorie counts as needed  - Recommend, monitor, and adjust tube feedings and TPN/PPN based on assessed needs  - Assess need for intravenous fluids  - Provide specific nutrition/hydration education as appropriate  - Include patient/family/caregiver in decisions related to nutrition  Outcome: Progressing

## 2022-08-15 NOTE — PROGRESS NOTES
SUBJECTIVE:  Yokasta Hardy presents today for return prenatal visit. She complains of: no unusual complaints. Patient's medical, obstetrical, social, and family histories; medications; and lab results have been reviewed. REVIEW OF SYSTEMS: A comprehensive review of systems was negative except for that written in the HPI. 35w0d    OBJECTIVE:   Visit Vitals  BP (!) 170/108 (BP 1 Location: Right lower arm, BP Patient Position: Sitting)   Ht 5' 4\" (1.626 m)   Wt 337 lb (152.9 kg)   LMP 12/03/2021 (LMP Unknown)   BMI 57.85 kg/m²      Refer to Prenatal Physical for exam findings    ASSESSMENT/PLAN:  Prenatal Education: 3rd trimester topics:  signs and symptoms of PIH, sent to LD for evaluation and to r/o superimposed PIH  Patient/guardian understands and agrees with the plan of care.

## 2022-08-15 NOTE — PROGRESS NOTES
1017: Patient presents to unit from Dr. Sol Etienne office with elevated blood pressure readings. Patient endorses fetal movement. Patient denies vaginal bleeding, leaking/gushing of fluids, or trauma to her abdomen. Patient denies headache, visual disturbances, or any RUQ pain. Patient denies recent sexual intercourse. Patient escorted to bathroom to leave urine sample and change into gown. 1022: EFM tracing initiated at this time. Assessment begun. 1039: Dr. Teresa Fulton at bedside. Plan of care reviewed with patient. Waiting on labs, will give IM steroids, and review pressures. Patient understanding of plan of care. 1050: MD aware of difficulty tracing baby. 1140: Dr. Teresa Fulton made aware of patient's pressure and so far resulted labs. 1148: Dr. Teresa Fulton at bedside explaining use of Procardia and reason we are giving to patient at this time. Patient understanding and agreeing to plan. 1252: Urine tubed to lab at this time. Spasic reviewed strip at this time. NST reactive. 1350: Dr. Teresa Fulton reviewed all labs and blood pressure readings. Wants patient to stay at least until evening shift to monitor pressures since we medicated. Q4 EFM monitoring. 1500: Patient updated on plan; patient understanding. 1730: Dr. Teresa Fulton at bedside; updated patient on plan. Patient to stay overnight. Orders placed. Intermittent monitoring order still valid. 1740: Dinner tray taken into patient. Patient taken off of EFM. Patient understanding of plan of care. 1905: Bedside shift report given to oncoming night shift nurse.

## 2022-08-15 NOTE — H&P
History & Physical    Name: Silviano Whittaker MRN: [id number]  SSN: [id number]    YOB: 1995  Age: 27 y.o. Sex: female        Subjective:     Estimated Date of Delivery: 22  OB History    Para Term  AB Living   1 0 0 0 0 0   SAB IAB Ectopic Molar Multiple Live Births   0 0 0 0 0 0      # Outcome Date GA Lbr Isac/2nd Weight Sex Delivery Anes PTL Lv   1 Current                Ms. Bridger Owusu is admitted with pregnancy at 35w0d for  elevated BP at the office . Prenatal course was complicated by chronic hypertension and and obesity . Please see prenatal records for details. Past Medical History:   Diagnosis Date    Cystic fibrosis carrier 2022    Essential hypertension      Past Surgical History:   Procedure Laterality Date    HX OOPHORECTOMY Right     HX OTHER SURGICAL       Social History     Occupational History    Not on file   Tobacco Use    Smoking status: Never    Smokeless tobacco: Never   Vaping Use    Vaping Use: Never used   Substance and Sexual Activity    Alcohol use: Never    Drug use: Never    Sexual activity: Yes     Partners: Male     Birth control/protection: None     No family history on file. No Known Allergies  Prior to Admission medications    Medication Sig Start Date End Date Taking? Authorizing Provider   aspirin delayed-release 81 mg tablet Take 1 Tablet by mouth daily. 22  Yes Elizabeth Saravia MD   labetaloL (NORMODYNE) 100 mg tablet Take 3 Tablets by mouth two (2) times a day. Indications: pre-existing hypertension during pregnancy 22  Yes Elizabeth Saravia MD   prenatal vit-calcium-iron-fa (Prenatal Plus, calcium carb,) 27 mg iron- 1 mg tab Take 1 Tablet by mouth daily. Yes Provider, Historical   folic acid-vit W1-DUN G15 (FOLBEE, AV-OLIVIA, VIRT-OLIVIA) 2.5-25-1 mg tablet Take 1 Tablet by mouth daily.    Yes Provider, Historical        Review of Systems: A comprehensive review of systems was negative except for that written in the HPI.    Objective:     Vitals:  Vitals:    08/15/22 1023 08/15/22 1030   BP:  (!) 164/105   Pulse:  (!) 112   Resp:  20   Temp:  98.8 °F (37.1 °C)   SpO2:  99%   Height: 5' 4\" (1.626 m)         Physical Exam:  Patient without distress.   Heart: Regular rate and rhythm, S1S2 present, or without murmur or extra heart sounds  Lung: clear to auscultation throughout lung fields, no wheezes, no rales, no rhonchi, and normal respiratory effort  Back: costovertebral angle tenderness absent  Abdomen: soft, nontender  Fundus: soft and non tender  Perineum: blood absent, amniotic fluid absent  Cervical Exam: Closed/Thick/High  Lower Extremities:  - Edema No  Membranes:  Intact  Fetal Heart Rate: Reactive    Prenatal Labs:   No results found for: ABORH, RUBELLAEXT, GRBSEXT, HBSAGEXT, HIVEXT, RPREXT, GONNOEXT, CHLAMEXT, ABORHEXT, RUBELLAEXT, GRBSEXT, HBSAGEXT, HIVEXT, RPREXT, GONNOEXT, CHLAMEXT      Assessment/Plan:     Active Problems:    Hypertension affecting pregnancy (8/15/2022)     26 yo G1 at 35 weeks gestation sent from the office due to elevated BP  PNC complicated by elevated BP and obesity  Pt is on labetalol 300 bid  Morbid obesity  Followed by MFM, weekly  NST reactive    Plan: Admit for [address] work up  [address] labs  BMZ  Labetalol 300 tid  Serial BP

## 2022-08-15 NOTE — PROGRESS NOTES
Pt seen and examined, pt desire to go home  Chart reviewed,  Pt sent from the office for evaluation of elevated BP  PIH labs nl  NST reactive  BP meds adjusted to labetalol 300 tid and Procardia 90 XL  Now BP in 140-150 /80-90 range, pt asymptomatic  She was given an options to stay overnight and repeat labs in am, but pt prefer to stay at home on bedrest as I advise her. PT states that she has an molina with [facility] on Thursday and in the meantime, will follow up with   Pt was discharged with instructions.

## 2022-08-15 NOTE — PATIENT INSTRUCTIONS
Counting Your Baby's Kicks: Care Instructions  Overview     Counting your baby's kicks is one way your doctor can tell that your baby is healthy. Most women--especially in a first pregnancy--feel their baby move for the first time between 16 and 22 weeks. The movement may feel like flutters rather than kicks. Your baby may move more at certain times of the day. When you are active, you may notice less kicking than when you are resting. At your prenatal visits, your doctor will ask whether the baby is active. In your last trimester, your doctor may ask you to count the number of times you feel your baby move. Follow-up care is a key part of your treatment and safety. Be sure to make and go to all appointments, and call your doctor if you are having problems. It's also a good idea to know your test results and keep a list of the medicines you take. How do you count fetal kicks? A common method of checking your baby's movement is to note the length of time it takes to count ten movements (such as kicks, flutters, or rolls). Pick your baby's most active time of day to count. This may be any time from morning to evening. If you don't feel 10 movements in an hour, have something to eat or drink and count for another hour. If you don't feel at least 10 movements in the 2-hour period, call your doctor. When should you call for help? Call your doctor now or seek immediate medical care if:    You noticed that your baby has stopped moving or is moving much less than normal.   Watch closely for changes in your health, and be sure to contact your doctor if you have any problems. Where can you learn more? Go to http://www.gray.com/  Enter [id number] in the search box to learn more about \"Counting Your Baby's Kicks: Care Instructions. \"  Current as of: June 16, 2021               Content Version: 13.2  © 6943-9702 Healthwise, [employer].    Care instructions adapted under license by [employer] (which disclaims liability or warranty for this information). If you have questions about a medical condition or this instruction, always ask your healthcare professional. [address] any warranty or liability for your use of this information. oral

## 2022-08-16 ENCOUNTER — TELEPHONE (OUTPATIENT)
Dept: OBGYN CLINIC | Age: 27
End: 2022-08-16

## 2022-08-16 ENCOUNTER — HOSPITAL ENCOUNTER (OUTPATIENT)
Age: 27
Discharge: HOME | End: 2022-08-16
Attending: OBSTETRICS & GYNECOLOGY | Admitting: OBSTETRICS & GYNECOLOGY
Payer: COMMERCIAL

## 2022-08-16 VITALS
OXYGEN SATURATION: 99 % | TEMPERATURE: 98.5 F | HEART RATE: 98 BPM | DIASTOLIC BLOOD PRESSURE: 92 MMHG | SYSTOLIC BLOOD PRESSURE: 151 MMHG

## 2022-08-16 PROCEDURE — 74011250636 HC RX REV CODE- 250/636: Performed by: OBSTETRICS & GYNECOLOGY

## 2022-08-16 PROCEDURE — 59025 FETAL NON-STRESS TEST: CPT

## 2022-08-16 PROCEDURE — 99285 EMERGENCY DEPT VISIT HI MDM: CPT

## 2022-08-16 PROCEDURE — 96372 THER/PROPH/DIAG INJ SC/IM: CPT

## 2022-08-16 RX ORDER — BETAMETHASONE SODIUM PHOSPHATE AND BETAMETHASONE ACETATE 3; 3 MG/ML; MG/ML
12 INJECTION, SUSPENSION INTRA-ARTICULAR; INTRALESIONAL; INTRAMUSCULAR; SOFT TISSUE ONCE
Status: COMPLETED | OUTPATIENT
Start: 2022-08-16 | End: 2022-08-16

## 2022-08-16 RX ADMIN — BETAMETHASONE SODIUM PHOSPHATE AND BETAMETHASONE ACETATE 12 MG: 3; 3 INJECTION, SUSPENSION INTRA-ARTICULAR; INTRALESIONAL; INTRAMUSCULAR; SOFT TISSUE at 11:08

## 2022-08-16 NOTE — DISCHARGE INSTRUCTIONS
Counting Your Baby's Kicks: Care Instructions  Overview     Counting your baby's kicks is one way your doctor can tell that your baby is healthy. Most women--especially in a first pregnancy--feel their baby move for the first time between 16 and 22 weeks. The movement may feel like flutters rather than kicks. Your baby may move more at certain times of the day. When you are active, you may notice less kicking than when you are resting. At your prenatal visits, your doctor will ask whether the baby is active. In your last trimester, your doctor may ask you to count the number of times you feel your baby move. Follow-up care is a key part of your treatment and safety. Be sure to make and go to all appointments, and call your doctor if you are having problems. It's also a good idea to know your test results and keep a list of the medicines you take. How do you count fetal kicks? A common method of checking your baby's movement is to note the length of time it takes to count ten movements (such as kicks, flutters, or rolls). Pick your baby's most active time of day to count. This may be any time from morning to evening. If you don't feel 10 movements in an hour, have something to eat or drink and count for another hour. If you don't feel at least 10 movements in the 2-hour period, call your doctor. When should you call for help? Call your doctor now or seek immediate medical care if:    You noticed that your baby has stopped moving or is moving much less than normal.   Watch closely for changes in your health, and be sure to contact your doctor if you have any problems. Where can you learn more? Go to http://www.gray.com/  Enter [id number] in the search box to learn more about \"Counting Your Baby's Kicks: Care Instructions. \"  Current as of: June 16, 2021               Content Version: 13.2  © 0726-8439 [employer], [employer].    Care instructions adapted under license by [facility] (which disclaims liability or warranty for this information). If you have questions about a medical condition or this instruction, always ask your healthcare professional. [address] any warranty or liability for your use of this information. Pregnancy Precautions: Care Instructions  Your Care Instructions     There is no sure way to prevent labor before your due date ( labor) or to prevent most other pregnancy problems. But there are things you can do to increase your chances of a healthy pregnancy. Go to your appointments, follow your doctor's advice, and take good care of yourself. Eat well, and exercise (if your doctor agrees). And make sure to drink plenty of water. Follow-up care is a key part of your treatment and safety. Be sure to make and go to all appointments, and call your doctor if you are having problems. It's also a good idea to know your test results and keep a list of the medicines you take. How can you care for yourself at home? Make sure you go to your prenatal appointments. At each visit, your doctor will check your blood pressure. Your doctor will also check to see if you have protein in your urine. High blood pressure and protein in urine are signs of preeclampsia. This condition can be dangerous for you and your baby. Drink plenty of fluids. Dehydration can cause contractions. If you have kidney, heart, or liver disease and have to limit fluids, talk with your doctor before you increase the amount of fluids you drink. Tell your doctor right away if you notice any symptoms of an infection, such as:  Burning when you urinate. A foul-smelling discharge from your vagina. Vaginal itching. Unexplained fever. Unusual pain or soreness in your uterus or lower belly. Eat a balanced diet. Include plenty of foods that are high in calcium and iron. Foods high in calcium include milk, cheese, yogurt, almonds, and broccoli.   Foods high in iron include red meat, shellfish, poultry, eggs, beans, raisins, whole-grain bread, and leafy green vegetables. Do not smoke. If you need help quitting, talk to your doctor about stop-smoking programs and medicines. These can increase your chances of quitting for good. Do not drink alcohol or use marijuana or illegal drugs. Follow your doctor's directions about activity. Your doctor will let you know how much, if any, exercise you can do. Ask your doctor if you can have sex. If you are at risk for early labor, your doctor may ask you to not have sex. Take care to prevent falls. During pregnancy, your joints are loose, and your balance is off. Sports such as bicycling, skiing, or in-line skating can increase your risk of falling. And don't ride horses or motorcycles, dive, water ski, scuba dive, or parachute jump while you are pregnant. Avoid things that can make your body too hot and may be harmful to your baby, such as a hot tub or sauna. Or talk with your doctor before doing anything that raises your body temperature. Your doctor can tell you if it's safe. Do not take any over-the-counter or herbal medicines or supplements without talking to your doctor or pharmacist first.  When should you call for help? Call 911  anytime you think you may need emergency care. For example, call if:    You passed out (lost consciousness). You have a seizure. You have severe vaginal bleeding. You have severe pain in your belly or pelvis. You have had fluid gushing or leaking from your vagina and you know or think the umbilical cord is bulging into your vagina. If this happens, immediately get down on your knees so your rear end (buttocks) is higher than your head. This will decrease the pressure on the cord until help arrives. Call your doctor now or seek immediate medical care if:    You have signs of preeclampsia, such as:  Sudden swelling of your face, hands, or feet.   New vision problems (such as dimness, blurring, or seeing spots). A severe headache. You have any vaginal bleeding. You have belly pain or cramping. You have a fever. You have had regular contractions (with or without pain) for an hour. This means that you have 8 or more within 1 hour or 4 or more in 20 minutes after you change your position and drink fluids. You have a sudden release of fluid from your vagina. You have low back pain or pelvic pressure that does not go away. You notice that your baby has stopped moving or is moving much less than normal.   Watch closely for changes in your health, and be sure to contact your doctor if you have any problems. Where can you learn more? Go to http://www.fuchs.com/  Enter Y951 in the search box to learn more about \"Pregnancy Precautions: Care Instructions. \"  Current as of: June 16, 2021               Content Version: 13.2  © 7848-5247 [employer]. Care instructions adapted under license by [employer] (which disclaims liability or warranty for this information). If you have questions about a medical condition or this instruction, always ask your healthcare professional. [place] [address] any warranty or liability for your use of this information. High Blood Pressure in Pregnancy: Care Instructions  Overview     High blood pressure (hypertension) means that the force of blood against your artery walls is too strong. High blood pressure problems during pregnancy include:  Chronic hypertension. This is high blood pressure that starts before pregnancy. Gestational hypertension. This is high blood pressure that starts in the second or third trimester of pregnancy. Preeclampsia. This is a problem that includes high blood pressure and signs of organ injury during pregnancy. In some cases, it leads to eclampsia. Eclampsia causes seizures.   High blood pressure during pregnancy can affect the amount of oxygen and nutrients your baby receives. This can affect how your baby grows. High blood pressure can also cause other serious problems for both you and your baby, such as placental abruption. To prevent problems, you and your baby will be watched very closely. You will have to check your blood pressure often during pregnancy and possibly after pregnancy. If your blood pressure rises suddenly or is very high during your pregnancy, your doctor may prescribe medicines. They can usually control blood pressure. If your blood pressure affects your or your baby's health, your doctor may need to deliver your baby early. After your baby is born, your blood pressure will probably improve. But sometimes blood pressure problems continue after birth. Follow-up care is a key part of your treatment and safety. Be sure to make and go to all appointments, and call your doctor if you are having problems. It's also a good idea to know your test results and keep a list of the medicines you take. How can you care for yourself at home? Take and write down your blood pressure at home if your doctor says to. Take your medicines exactly as prescribed. Call your doctor if you think you are having a problem with your medicine. Do not smoke. This is one of the best things you can do to help your baby be healthy. If you need help quitting, talk to your doctor about stop-smoking programs and medicines. These can increase your chances of quitting for good. Don't gain too much weight during pregnancy. Talk to your doctor about how much weight gain is healthy. Eat a healthy diet. If your doctor says it's okay, get regular exercise. Walking or swimming several times a week can be healthy for you and your baby. Reduce stress, and find time to relax. This is very important if you continue to work or have a busy schedule. It's also important if you have small children at home. When should you call for help?   Share this information with your partner or a friend. They can help you watch for warning signs. Call 911  anytime you think you may need emergency care. For example, call if:    You passed out (lost consciousness). You have a seizure. Call your doctor now or seek immediate medical care if:    You have symptoms of preeclampsia, such as:  Sudden swelling of your face, hands, or feet. New vision problems (such as dimness, blurring, or seeing spots). A severe headache. Your blood pressure is very high, such as 160/110 or higher. Your blood pressure is higher than your doctor told you it should be, or it rises quickly. You have new nausea or vomiting. You think that you are in labor. You have pain in your belly or pelvis. Watch closely for changes in your health, and be sure to contact your doctor if:    You gain weight rapidly. Where can you learn more? Go to http://www.gray.com/  Enter A052 in the search box to learn more about \"High Blood Pressure in Pregnancy: Care Instructions. \"  Current as of: June 16, 2021               Content Version: 13.2  © 2006-2022 "[employer]. Care instructions adapted under license by [employer] (which disclaims liability or warranty for this information). If you have questions about a medical condition or this instruction, always ask your healthcare professional. [address] any warranty or liability for your use of this information.

## 2022-08-18 ENCOUNTER — HOSPITAL ENCOUNTER (INPATIENT)
Age: 27
LOS: 5 days | Discharge: HOME | End: 2022-08-23
Attending: OBSTETRICS & GYNECOLOGY | Admitting: OBSTETRICS & GYNECOLOGY
Payer: COMMERCIAL

## 2022-08-18 ENCOUNTER — APPOINTMENT (OUTPATIENT)
Dept: ULTRASOUND IMAGING | Age: 27
End: 2022-08-18
Attending: OBSTETRICS & GYNECOLOGY
Payer: COMMERCIAL

## 2022-08-18 DIAGNOSIS — O11.9 PREECLAMPSIA COMPLICATING HYPERTENSION: Primary | ICD-10-CM

## 2022-08-18 PROBLEM — Z34.90 PREGNANCY: Status: ACTIVE | Noted: 2022-08-18

## 2022-08-18 LAB
ABO + RH BLD: NORMAL
ALBUMIN SERPL-MCNC: 2.8 G/DL (ref 3.5–5)
ALBUMIN/GLOB SERPL: 0.7 {RATIO} (ref 1.1–2.2)
ALP SERPL-CCNC: 68 U/L (ref 45–117)
ALT SERPL-CCNC: 18 U/L (ref 12–78)
ANION GAP SERPL CALC-SCNC: 5 MMOL/L (ref 5–15)
APPEARANCE UR: CLEAR
AST SERPL W P-5'-P-CCNC: 12 U/L (ref 15–37)
BACTERIA URNS QL MICRO: NEGATIVE /HPF
BASOPHILS # BLD: 0 K/UL (ref 0–0.1)
BASOPHILS NFR BLD: 0 % (ref 0–1)
BILIRUB SERPL-MCNC: 0.2 MG/DL (ref 0.2–1)
BILIRUB UR QL: NEGATIVE
BLOOD GROUP ANTIBODIES SERPL: NEGATIVE
BUN SERPL-MCNC: 11 MG/DL (ref 6–20)
BUN/CREAT SERPL: 20 (ref 12–20)
CA-I BLD-MCNC: 9 MG/DL (ref 8.5–10.1)
CHLORIDE SERPL-SCNC: 106 MMOL/L (ref 97–108)
CO2 SERPL-SCNC: 25 MMOL/L (ref 21–32)
COLOR UR: YELLOW
CREAT SERPL-MCNC: 0.54 MG/DL (ref 0.55–1.02)
CREAT UR-MCNC: 80 MG/DL
DIFFERENTIAL METHOD BLD: ABNORMAL
EOSINOPHIL # BLD: 0 K/UL (ref 0–0.4)
EOSINOPHIL NFR BLD: 0 % (ref 0–7)
ERYTHROCYTE [DISTWIDTH] IN BLOOD BY AUTOMATED COUNT: 14.8 % (ref 11.5–14.5)
GLOBULIN SER CALC-MCNC: 4 G/DL (ref 2–4)
GLUCOSE SERPL-MCNC: 87 MG/DL (ref 65–100)
GLUCOSE UR STRIP.AUTO-MCNC: NEGATIVE MG/DL
HCT VFR BLD AUTO: 33.6 % (ref 35–47)
HGB BLD-MCNC: 11 G/DL (ref 11.5–16)
HGB UR QL STRIP: NEGATIVE
IMM GRANULOCYTES # BLD AUTO: 0.1 K/UL (ref 0–0.04)
IMM GRANULOCYTES NFR BLD AUTO: 1 % (ref 0–0.5)
KETONES UR QL STRIP.AUTO: NEGATIVE MG/DL
LEUKOCYTE ESTERASE UR QL STRIP.AUTO: ABNORMAL
LYMPHOCYTES # BLD: 2.9 K/UL (ref 0.8–3.5)
LYMPHOCYTES NFR BLD: 25 % (ref 12–49)
MCH RBC QN AUTO: 27.8 PG (ref 26–34)
MCHC RBC AUTO-ENTMCNC: 32.7 G/DL (ref 30–36.5)
MCV RBC AUTO: 85.1 FL (ref 80–99)
MONOCYTES # BLD: 1.2 K/UL (ref 0–1)
MONOCYTES NFR BLD: 10 % (ref 5–13)
MUCOUS THREADS URNS QL MICRO: ABNORMAL /LPF
NEUTS SEG # BLD: 7.4 K/UL (ref 1.8–8)
NEUTS SEG NFR BLD: 64 % (ref 32–75)
NITRITE UR QL STRIP.AUTO: NEGATIVE
NRBC # BLD: 0.02 K/UL (ref 0–0.01)
NRBC BLD-RTO: 0.2 PER 100 WBC
PH UR STRIP: 6.5 [PH] (ref 5–8)
PLATELET # BLD AUTO: 308 K/UL (ref 150–400)
PMV BLD AUTO: 11.3 FL (ref 8.9–12.9)
POTASSIUM SERPL-SCNC: 4.5 MMOL/L (ref 3.5–5.1)
PROT SERPL-MCNC: 6.8 G/DL (ref 6.4–8.2)
PROT UR STRIP-MCNC: NEGATIVE MG/DL
PROT UR-MCNC: 36 MG/DL (ref 0–11.9)
PROT/CREAT UR-RTO: 0.5
RBC # BLD AUTO: 3.95 M/UL (ref 3.8–5.2)
RBC #/AREA URNS HPF: ABNORMAL /HPF (ref 0–5)
SODIUM SERPL-SCNC: 136 MMOL/L (ref 136–145)
SP GR UR REFRACTOMETRY: 1.01 (ref 1–1.03)
SPECIMEN EXP DATE BLD: NORMAL
URATE SERPL-MCNC: 5.6 MG/DL (ref 2.6–6)
UROBILINOGEN UR QL STRIP.AUTO: NEGATIVE EU/DL (ref 0.1–1)
WBC # BLD AUTO: 11.7 K/UL (ref 3.6–11)
WBC URNS QL MICRO: ABNORMAL /HPF (ref 0–4)

## 2022-08-18 PROCEDURE — 84156 ASSAY OF PROTEIN URINE: CPT

## 2022-08-18 PROCEDURE — 3E0DXGC INTRODUCTION OF OTHER THERAPEUTIC SUBSTANCE INTO MOUTH AND PHARYNX, EXTERNAL APPROACH: ICD-10-PCS | Performed by: OBSTETRICS & GYNECOLOGY

## 2022-08-18 PROCEDURE — 86900 BLOOD TYPING SEROLOGIC ABO: CPT

## 2022-08-18 PROCEDURE — 84550 ASSAY OF BLOOD/URIC ACID: CPT

## 2022-08-18 PROCEDURE — 74011250637 HC RX REV CODE- 250/637: Performed by: OBSTETRICS & GYNECOLOGY

## 2022-08-18 PROCEDURE — 85025 COMPLETE CBC W/AUTO DIFF WBC: CPT

## 2022-08-18 PROCEDURE — 99285 EMERGENCY DEPT VISIT HI MDM: CPT

## 2022-08-18 PROCEDURE — 65410000002 HC RM PRIVATE OB

## 2022-08-18 PROCEDURE — 36415 COLL VENOUS BLD VENIPUNCTURE: CPT

## 2022-08-18 PROCEDURE — 80307 DRUG TEST PRSMV CHEM ANLYZR: CPT

## 2022-08-18 PROCEDURE — 76815 OB US LIMITED FETUS(S): CPT

## 2022-08-18 PROCEDURE — 81001 URINALYSIS AUTO W/SCOPE: CPT

## 2022-08-18 PROCEDURE — 80053 COMPREHEN METABOLIC PANEL: CPT

## 2022-08-18 RX ORDER — OXYTOCIN/RINGER'S LACTATE 30/500 ML
87.3 PLASTIC BAG, INJECTION (ML) INTRAVENOUS AS NEEDED
Status: DISCONTINUED | OUTPATIENT
Start: 2022-08-18 | End: 2022-08-20

## 2022-08-18 RX ORDER — SODIUM CHLORIDE, SODIUM LACTATE, POTASSIUM CHLORIDE, CALCIUM CHLORIDE 600; 310; 30; 20 MG/100ML; MG/100ML; MG/100ML; MG/100ML
125 INJECTION, SOLUTION INTRAVENOUS CONTINUOUS
Status: DISCONTINUED | OUTPATIENT
Start: 2022-08-19 | End: 2022-08-20

## 2022-08-18 RX ORDER — SODIUM CHLORIDE, SODIUM LACTATE, POTASSIUM CHLORIDE, CALCIUM CHLORIDE 600; 310; 30; 20 MG/100ML; MG/100ML; MG/100ML; MG/100ML
125 INJECTION, SOLUTION INTRAVENOUS CONTINUOUS
Status: DISCONTINUED | OUTPATIENT
Start: 2022-08-18 | End: 2022-08-18

## 2022-08-18 RX ORDER — ZOLPIDEM TARTRATE 5 MG/1
5 TABLET ORAL
Status: DISCONTINUED | OUTPATIENT
Start: 2022-08-18 | End: 2022-08-20 | Stop reason: SDUPTHER

## 2022-08-18 RX ORDER — BUTORPHANOL TARTRATE 1 MG/ML
1 INJECTION INTRAMUSCULAR; INTRAVENOUS
Status: DISCONTINUED | OUTPATIENT
Start: 2022-08-18 | End: 2022-08-20

## 2022-08-18 RX ORDER — NALOXONE HYDROCHLORIDE 0.4 MG/ML
0.4 INJECTION, SOLUTION INTRAMUSCULAR; INTRAVENOUS; SUBCUTANEOUS AS NEEDED
Status: DISCONTINUED | OUTPATIENT
Start: 2022-08-18 | End: 2022-08-20 | Stop reason: HOSPADM

## 2022-08-18 RX ORDER — SODIUM CHLORIDE 0.9 % (FLUSH) 0.9 %
5-40 SYRINGE (ML) INJECTION EVERY 8 HOURS
Status: DISCONTINUED | OUTPATIENT
Start: 2022-08-18 | End: 2022-08-18

## 2022-08-18 RX ORDER — NIFEDIPINE 30 MG/1
90 TABLET, EXTENDED RELEASE ORAL DAILY
Status: DISCONTINUED | OUTPATIENT
Start: 2022-08-19 | End: 2022-08-21

## 2022-08-18 RX ORDER — OXYTOCIN/RINGER'S LACTATE 30/500 ML
10 PLASTIC BAG, INJECTION (ML) INTRAVENOUS AS NEEDED
Status: DISCONTINUED | OUTPATIENT
Start: 2022-08-18 | End: 2022-08-20

## 2022-08-18 RX ORDER — ONDANSETRON 2 MG/ML
4 INJECTION INTRAMUSCULAR; INTRAVENOUS
Status: DISCONTINUED | OUTPATIENT
Start: 2022-08-18 | End: 2022-08-23 | Stop reason: HOSPADM

## 2022-08-18 RX ADMIN — ZOLPIDEM TARTRATE 5 MG: 5 TABLET ORAL at 21:36

## 2022-08-18 RX ADMIN — LABETALOL HYDROCHLORIDE 300 MG: 200 TABLET, FILM COATED ORAL at 21:36

## 2022-08-18 RX ADMIN — LABETALOL HYDROCHLORIDE 300 MG: 200 TABLET, FILM COATED ORAL at 13:17

## 2022-08-18 RX ADMIN — Medication 25 MCG: at 18:07

## 2022-08-18 NOTE — H&P
History & Physical    Name: Cornelius Marrero MRN: [id number]  SSN: [id number]    YOB: 1995  Age: 27 y.o. Sex: female        Subjective:     Estimated Date of Delivery: 22  OB History          1    Para   0    Term   0       0    AB   0    Living   0         SAB   0    IAB   0    Ectopic   0    Molar   0    Multiple   0    Live Births   0                MsWu Rutherford is admitted with pregnancy at 35w3d for preeclampsia and oligohydramnios. . Prenatal course her prenatal course has been complicated by chronic hypertension which seems to have worsened recently. Please see prenatal records for details. Past Medical History:   Diagnosis Date    Cystic fibrosis carrier 2022    Essential hypertension      Past Surgical History:   Procedure Laterality Date    HX OOPHORECTOMY Right     HX OTHER SURGICAL       Social History     Occupational History    Not on file   Tobacco Use    Smoking status: Never    Smokeless tobacco: Never   Vaping Use    Vaping Use: Never used   Substance and Sexual Activity    Alcohol use: Never    Drug use: Never    Sexual activity: Yes     Partners: Male     Birth control/protection: None     History reviewed. No pertinent family history. No Known Allergies  Prior to Admission medications    Medication Sig Start Date End Date Taking? Authorizing Provider   labetaloL (NORMODYNE) 300 mg tablet Take 1 Tablet by mouth three (3) times daily. 8/15/22  Yes Anyi García MD   NIFEdipine ER (Procardia XL) 90 mg ER tablet Take 1 Tablet by mouth in the morning. 8/15/22  Yes Anyi García MD   aspirin delayed-release 81 mg tablet Take 1 Tablet by mouth daily. 22  Yes Isidro Dorsey MD   prenatal vit-calcium-iron-fa (Prenatal Plus, calcium carb,) 27 mg iron- 1 mg tab Take 1 Tablet by mouth daily. Yes Provider, Historical   folic acid-vit Y8-RRR J20 (FOLBEE, AV-OLIVIA, VIRT-OLIVIA) 2.5-25-1 mg tablet Take 1 Tablet by mouth daily.   Patient not taking: Reported on 8/18/2022    Provider, Historical        Review of Systems: A comprehensive review of systems was negative except for that written in the HPI. Objective:     Vitals:  Vitals:    08/18/22 1511 08/18/22 1516 08/18/22 1521 08/18/22 1526   BP:  137/70     Pulse:  (!) 110     Resp:       Temp:       SpO2: 97% 97% 97% 97%   Weight:       Height:            Physical Exam:  Patient is a well-developed obese female apparent distress  She is alert and oriented x3  Heart is with regular rate and rhythm  Lungs are clear  Abdomen is gravid  Extremities without clubbing cyanosis or edema  Cervix is closed  Membranes:  Intact  Fetal Heart Rate: Fetal heart tones are in 140s and are reactive    Prenatal Labs:   Please see prenatal record, preeclampsia labs with protein creatinine ratio today elevated to 0.5, platelets and LFTs are within normal limits. Assessment/Plan:     Plan: Admit for induction of labor with Cytotec tonight for cervical ripening Group B Strep was not done.     Signed By:  Jose Damico MD     August 18, 2022

## 2022-08-18 NOTE — PROGRESS NOTES
1150: Pt arrives ambulatory from [facility]. Lizy Aguayo MD from [facility] called prior to arrival stating pt BP was elevated 180s/100s in office and EMILY was 3. States she recommends delivery. Pt denies bleeding or leaking of fluid. Denies pain. Denies headache, visual disturbances. 1222: Dr. Ang Maldonado on unit. Bps reviewed. States to obtain labs and ultrasound for EMILY.     1623: Orders received to admit patient. States will be in to assess pt and discuss plan of care. 1647: Dr. Ang Maldonado in room. SVE performed Plan of care for cytotec discussed with pt. Informed consents obtained.

## 2022-08-19 ENCOUNTER — ANESTHESIA (OUTPATIENT)
Dept: LABOR AND DELIVERY | Age: 27
End: 2022-08-19
Payer: COMMERCIAL

## 2022-08-19 PROBLEM — O11.9 PREECLAMPSIA COMPLICATING HYPERTENSION: Status: ACTIVE | Noted: 2022-08-19

## 2022-08-19 LAB
A VAGINAE DNA VAG QL NAA+PROBE: NORMAL SCORE
AMPHET UR QL SCN: NEGATIVE
B-HEM STREP SPEC QL CULT: NEGATIVE
BARBITURATES UR QL SCN: NEGATIVE
BENZODIAZ UR QL: NEGATIVE
BVAB2 DNA VAG QL NAA+PROBE: NORMAL SCORE
C ALBICANS DNA VAG QL NAA+PROBE: NEGATIVE
C GLABRATA DNA VAG QL NAA+PROBE: NEGATIVE
C KRUSEI DNA VAG QL NAA+PROBE: NEGATIVE
C LUSITANIAE DNA VAG QL NAA+PROBE: NEGATIVE
C TRACH DNA VAG QL NAA+PROBE: NEGATIVE
CANDIDA DNA VAG QL NAA+PROBE: NEGATIVE
CANNABINOIDS UR QL SCN: NEGATIVE
COCAINE UR QL SCN: NEGATIVE
DRUG SCRN COMMENT,DRGCM: NORMAL
MEGA1 DNA VAG QL NAA+PROBE: NORMAL SCORE
METHADONE UR QL: NEGATIVE
N GONORRHOEA DNA VAG QL NAA+PROBE: NEGATIVE
OPIATES UR QL: NEGATIVE
PCP UR QL: NEGATIVE
T VAGINALIS DNA VAG QL NAA+PROBE: NEGATIVE

## 2022-08-19 PROCEDURE — 74011250637 HC RX REV CODE- 250/637: Performed by: OBSTETRICS & GYNECOLOGY

## 2022-08-19 PROCEDURE — 87077 CULTURE AEROBIC IDENTIFY: CPT

## 2022-08-19 PROCEDURE — 87086 URINE CULTURE/COLONY COUNT: CPT

## 2022-08-19 PROCEDURE — 65410000002 HC RM PRIVATE OB

## 2022-08-19 PROCEDURE — 74011250636 HC RX REV CODE- 250/636: Performed by: OBSTETRICS & GYNECOLOGY

## 2022-08-19 PROCEDURE — 87186 SC STD MICRODIL/AGAR DIL: CPT

## 2022-08-19 RX ORDER — MAGNESIUM SULFATE HEPTAHYDRATE 40 MG/ML
4 INJECTION, SOLUTION INTRAVENOUS ONCE
Status: COMPLETED | OUTPATIENT
Start: 2022-08-19 | End: 2022-08-19

## 2022-08-19 RX ORDER — HYDRALAZINE HYDROCHLORIDE 20 MG/ML
10 INJECTION INTRAMUSCULAR; INTRAVENOUS ONCE
Status: COMPLETED | OUTPATIENT
Start: 2022-08-19 | End: 2022-08-19

## 2022-08-19 RX ORDER — CALCIUM GLUCONATE 20 MG/ML
1 INJECTION, SOLUTION INTRAVENOUS AS NEEDED
Status: DISCONTINUED | OUTPATIENT
Start: 2022-08-19 | End: 2022-08-20

## 2022-08-19 RX ORDER — CALCIUM GLUCONATE 20 MG/ML
1 INJECTION, SOLUTION INTRAVENOUS ONCE
Status: DISCONTINUED | OUTPATIENT
Start: 2022-08-19 | End: 2022-08-19

## 2022-08-19 RX ORDER — LABETALOL 200 MG/1
600 TABLET, FILM COATED ORAL 3 TIMES DAILY
Status: DISCONTINUED | OUTPATIENT
Start: 2022-08-19 | End: 2022-08-23 | Stop reason: HOSPADM

## 2022-08-19 RX ORDER — MAGNESIUM SULFATE HEPTAHYDRATE 40 MG/ML
2 INJECTION, SOLUTION INTRAVENOUS CONTINUOUS
Status: DISCONTINUED | OUTPATIENT
Start: 2022-08-19 | End: 2022-08-20

## 2022-08-19 RX ADMIN — Medication 25 MCG: at 00:01

## 2022-08-19 RX ADMIN — Medication 25 MCG: at 10:00

## 2022-08-19 RX ADMIN — SODIUM CHLORIDE, POTASSIUM CHLORIDE, SODIUM LACTATE AND CALCIUM CHLORIDE 125 ML/HR: 600; 310; 30; 20 INJECTION, SOLUTION INTRAVENOUS at 16:58

## 2022-08-19 RX ADMIN — MAGNESIUM SULFATE HEPTAHYDRATE 2 G/HR: 40 INJECTION, SOLUTION INTRAVENOUS at 23:26

## 2022-08-19 RX ADMIN — LABETALOL HYDROCHLORIDE 600 MG: 200 TABLET, FILM COATED ORAL at 16:21

## 2022-08-19 RX ADMIN — LABETALOL HYDROCHLORIDE 600 MG: 200 TABLET, FILM COATED ORAL at 08:43

## 2022-08-19 RX ADMIN — MAGNESIUM SULFATE HEPTAHYDRATE 2 G/HR: 40 INJECTION, SOLUTION INTRAVENOUS at 07:34

## 2022-08-19 RX ADMIN — LABETALOL HYDROCHLORIDE 600 MG: 200 TABLET, FILM COATED ORAL at 21:53

## 2022-08-19 RX ADMIN — Medication 25 MCG: at 14:11

## 2022-08-19 RX ADMIN — MAGNESIUM SULFATE HEPTAHYDRATE 4 G: 40 INJECTION, SOLUTION INTRAVENOUS at 07:10

## 2022-08-19 RX ADMIN — ZOLPIDEM TARTRATE 5 MG: 5 TABLET ORAL at 21:53

## 2022-08-19 RX ADMIN — HYDRALAZINE HYDROCHLORIDE 10 MG: 20 INJECTION, SOLUTION INTRAMUSCULAR; INTRAVENOUS at 07:25

## 2022-08-19 RX ADMIN — Medication 25 MCG: at 06:01

## 2022-08-19 RX ADMIN — SODIUM CHLORIDE, POTASSIUM CHLORIDE, SODIUM LACTATE AND CALCIUM CHLORIDE 125 ML/HR: 600; 310; 30; 20 INJECTION, SOLUTION INTRAVENOUS at 06:02

## 2022-08-19 NOTE — PROGRESS NOTES
710- Bedside shift change report received from Ewa Dueñas RN. IV magnesium bolus initiated at this time. 720- Mehta catheter inserted w/ JERMAINE Lubin RN under sterile technique. Urine sample collected and tubed to lab for culture. 5664- Second peripheral IV inserted in one attempt by Jaziel Galaviz RN.    705- SVE performed by Dr. Kori Welch, cervix closed. Options for plan of care discussed w/ pt.    906- Breakfast provided to pt per MD orders. Pt sitting up in bed, fetal HR not tracing. SCDs applied to BLE.    - SVE performed by Dr. Kori Welch, cervix closed. Options discussed. Pt choosing to proceed w/ . - Peripheral IV on RUE leaking, IV discontinued. Hemostasis achieved, site covered w/ gauze. - Bedside shift change report given to GILBERT Gusman RN.

## 2022-08-19 NOTE — PROGRESS NOTES
1900- Bedside report with Jennifer Betancourt RN, this RN to assume care of patient. Vitals and assessment competed. POC discussed with patient. Patient has no questions or concerns at this time. 2105Chas Saleem RN at bedside assisting with finding baby on monitor   2119- Doppler used to assist in finding baby     2233- Paged Dr Jennifer Betancourt to inquire about intermittent monitoring as baby is very difficult to trace. 2238- Dr Jennifer Betancourt called, stated that patient should be monitored Q1 hour for 10 minutes to ensure baby is okay overnight. [id number] fetal monitor strip reassuring    0000- Patient given dose of cytotec. [id number] fetal monitor strip reassuring     [id number]- fetal monitor strip reassuring    [id number]- fetal monitor strip reassuring     [id number]- fetal monitor strip reassuring     [id number]- fetal monitor strip reassuring     [id number]- fetal monitor strip reassuring     0545- patient up to shower     [id number]- fetal monitor strip reassuring     0630- Dr Jennifer Betancourt paged about pts Bps. Awaiting response. 6011- Dr Jennifer Betancourt ordered 10 mg iv hydralazine and patient to be started on magnesium sulfate. Discussed POC with patient, patient agreeable at this time.      4310- patients Bp 138/77    0710- Magnesium sulfate protocol initiated with Will Brewer RN.     0725- Patients Bp 171/ 84, 10 mg iv hydralazine given per MD

## 2022-08-19 NOTE — PROGRESS NOTES
1901: Bedside shift report received from Surinder Nieves, CLAY. Pt resting comfortably at time of report. Mag sulfate rate verified with off going RN. 1917: Assessment completed. Updated pt regarding timing for c/s - currently no scheduled time frame for proceeding d/t anesthesia availability. Reviewed visitor policy with pt.     0252: Dr. Author Luna at bedside. Informed pt that c/s will be preformed in morning. Pt able to eat until 0000. Instructed to discontinue Cytotec order per Dr. Author Luna. 2018: Pt transferred to  bed for comfort. 2055: Monitor straps replaced with belly band in attempt to monitor baby more consistently. RN at bedside holding baby onto monitor. 2130: RN at bedside holding monitor on. Significant periods of broken tracing d/t maternal habitus and positioning. MD aware. 0024: GILBERT Hardy, CLAY and CASEY Denis RN at bedside attempting to improve FHR tracing. Difficult to trace d/t pt habitus and positioning. 0112: GILBERT Coates RN at bedside attempting to improve FHR tracing. Difficult to trace d/t pt habitus and positioning. 0220: Dr. Author Cheryl MD called regarding elevated BP's. Last two readings 163/75 and 129/115. New orders for 20mg IV labetalol now. 0300: 20mg IV labetalol held. Bps 130s/80s without treatment. MD aware. Instructed to give 20mg dose if BP's increase to >568 systolic or >153 diastolic. Dr. Author Luna on unit. Updated regarding blood tinge urine noted in catheter. Catheter not pulling, draining appropriately with good urine output. Appears to be clearing up. No new orders from MD.     0636: Dr. Author Luna called and updated on pt condition. 20mg labetalol given at 0554. BP's post administration 162/88 and 184/84. New orders for 40 of labetalol IV received. 0700: Bedside shift report given to GILBERT Condon RN. Pt resting comfortably throughout report.

## 2022-08-19 NOTE — PROGRESS NOTES
Progress Note    Patient: Barney Lira MRN: [id number]  SSN: [id number]    YOB: 1995  Age: 27 y.o. Sex: female      Admit Date: 8/18/2022    LOS: 1 day     Subjective:     No Complaints, denies any HA or visual changes    Objective:     Vitals:    08/19/22 0800 08/19/22 0815 08/19/22 0830 08/19/22 0845   BP: (!) 168/54 (!) 180/93 (!) 128/110 (!) 136/108   Pulse: (!) 106 (!) 112 (!) 112 (!) 111   Resp:       Temp:       SpO2: 97% 98% 97% 97%   Weight:       Height:            Intake and Output:  Current Shift: 08/19 0701 - 08/19 1900  In: -   Out: 300 [Urine:300]  Last three shifts: No intake/output data recorded. Physical Exam:   Abd:Gravid, NST reactive, no Uc's picked up on TOCO( maybe difficult given pt's body habitus)  Cx: Closed    Lab/Data Review: All lab results for the last 24 hours reviewed.          Assessment:     Active Problems:    Hypertension affecting pregnancy (8/15/2022)      Pregnancy (8/18/2022)      Preeclampsia complicating hypertension (8/19/2022)      Plan:     Had long discussion with the pt and her SO as to diagnosis, and various options regarding induction of labor- questions addressed  Hold Procardia( since on Magnesium)  Increase Labetalol to 600 mg PO TID  Breakfast this AM, then continue PO Cytotec      Signed By: Nakita Olea MD     August 19, 2022

## 2022-08-20 ENCOUNTER — ANESTHESIA EVENT (OUTPATIENT)
Dept: LABOR AND DELIVERY | Age: 27
End: 2022-08-20
Payer: COMMERCIAL

## 2022-08-20 PROCEDURE — 74011000250 HC RX REV CODE- 250: Performed by: ANESTHESIOLOGY

## 2022-08-20 PROCEDURE — 76060000034 HC ANESTHESIA 1.5 TO 2 HR: Performed by: OBSTETRICS & GYNECOLOGY

## 2022-08-20 PROCEDURE — 74011250636 HC RX REV CODE- 250/636: Performed by: ANESTHESIOLOGY

## 2022-08-20 PROCEDURE — 74011250636 HC RX REV CODE- 250/636: Performed by: NURSE ANESTHETIST, CERTIFIED REGISTERED

## 2022-08-20 PROCEDURE — 75410000002 HC LABOR FEE PER 1 HR

## 2022-08-20 PROCEDURE — 76010000392 HC C SECN EA ADDL 0.5 HR: Performed by: OBSTETRICS & GYNECOLOGY

## 2022-08-20 PROCEDURE — 74011250636 HC RX REV CODE- 250/636: Performed by: OBSTETRICS & GYNECOLOGY

## 2022-08-20 PROCEDURE — 74011000250 HC RX REV CODE- 250

## 2022-08-20 PROCEDURE — 76010000391 HC C SECN FIRST 1 HR: Performed by: OBSTETRICS & GYNECOLOGY

## 2022-08-20 PROCEDURE — 74011000258 HC RX REV CODE- 258: Performed by: NURSE ANESTHETIST, CERTIFIED REGISTERED

## 2022-08-20 PROCEDURE — 75410000003 HC RECOV DEL/VAG/CSECN EA 0.5 HR

## 2022-08-20 PROCEDURE — 74011250637 HC RX REV CODE- 250/637: Performed by: OBSTETRICS & GYNECOLOGY

## 2022-08-20 PROCEDURE — 74011000250 HC RX REV CODE- 250: Performed by: OBSTETRICS & GYNECOLOGY

## 2022-08-20 PROCEDURE — 65410000002 HC RM PRIVATE OB

## 2022-08-20 PROCEDURE — 76060000078 HC EPIDURAL ANESTHESIA: Performed by: OBSTETRICS & GYNECOLOGY

## 2022-08-20 RX ORDER — LABETALOL HYDROCHLORIDE 5 MG/ML
20 INJECTION, SOLUTION INTRAVENOUS ONCE
Status: COMPLETED | OUTPATIENT
Start: 2022-08-20 | End: 2022-08-20

## 2022-08-20 RX ORDER — SODIUM CHLORIDE 0.9 % (FLUSH) 0.9 %
5-40 SYRINGE (ML) INJECTION EVERY 8 HOURS
Status: DISCONTINUED | OUTPATIENT
Start: 2022-08-20 | End: 2022-08-22

## 2022-08-20 RX ORDER — OXYTOCIN/RINGER'S LACTATE 30/500 ML
10 PLASTIC BAG, INJECTION (ML) INTRAVENOUS AS NEEDED
Status: DISCONTINUED | OUTPATIENT
Start: 2022-08-20 | End: 2022-08-23 | Stop reason: HOSPADM

## 2022-08-20 RX ORDER — LABETALOL HYDROCHLORIDE 5 MG/ML
40 INJECTION, SOLUTION INTRAVENOUS ONCE
Status: COMPLETED | OUTPATIENT
Start: 2022-08-20 | End: 2022-08-20

## 2022-08-20 RX ORDER — SODIUM CHLORIDE, SODIUM LACTATE, POTASSIUM CHLORIDE, CALCIUM CHLORIDE 600; 310; 30; 20 MG/100ML; MG/100ML; MG/100ML; MG/100ML
100 INJECTION, SOLUTION INTRAVENOUS CONTINUOUS
Status: DISCONTINUED | OUTPATIENT
Start: 2022-08-20 | End: 2022-08-23 | Stop reason: HOSPADM

## 2022-08-20 RX ORDER — OXYCODONE HYDROCHLORIDE 5 MG/1
5 TABLET ORAL
Status: DISCONTINUED | OUTPATIENT
Start: 2022-08-20 | End: 2022-08-23 | Stop reason: HOSPADM

## 2022-08-20 RX ORDER — METOCLOPRAMIDE HYDROCHLORIDE 5 MG/ML
INJECTION INTRAMUSCULAR; INTRAVENOUS AS NEEDED
Status: DISCONTINUED | OUTPATIENT
Start: 2022-08-20 | End: 2022-08-20 | Stop reason: HOSPADM

## 2022-08-20 RX ORDER — SIMETHICONE 80 MG
80 TABLET,CHEWABLE ORAL AS NEEDED
Status: DISCONTINUED | OUTPATIENT
Start: 2022-08-20 | End: 2022-08-23 | Stop reason: HOSPADM

## 2022-08-20 RX ORDER — DEXAMETHASONE SODIUM PHOSPHATE 4 MG/ML
INJECTION, SOLUTION INTRA-ARTICULAR; INTRALESIONAL; INTRAMUSCULAR; INTRAVENOUS; SOFT TISSUE AS NEEDED
Status: DISCONTINUED | OUTPATIENT
Start: 2022-08-20 | End: 2022-08-20 | Stop reason: HOSPADM

## 2022-08-20 RX ORDER — OXYTOCIN/RINGER'S LACTATE 30/500 ML
87.3 PLASTIC BAG, INJECTION (ML) INTRAVENOUS AS NEEDED
Status: COMPLETED | OUTPATIENT
Start: 2022-08-20 | End: 2022-08-20

## 2022-08-20 RX ORDER — NIFEDIPINE 30 MG/1
60 TABLET, EXTENDED RELEASE ORAL ONCE
Status: COMPLETED | OUTPATIENT
Start: 2022-08-20 | End: 2022-08-20

## 2022-08-20 RX ORDER — LABETALOL HYDROCHLORIDE 5 MG/ML
INJECTION, SOLUTION INTRAVENOUS
Status: DISPENSED
Start: 2022-08-20 | End: 2022-08-20

## 2022-08-20 RX ORDER — MORPHINE SULFATE 0.5 MG/ML
INJECTION, SOLUTION EPIDURAL; INTRATHECAL; INTRAVENOUS
Status: SHIPPED | OUTPATIENT
Start: 2022-08-20 | End: 2022-08-20

## 2022-08-20 RX ORDER — FUROSEMIDE 40 MG/1
20 TABLET ORAL ONCE
Status: COMPLETED | OUTPATIENT
Start: 2022-08-20 | End: 2022-08-20

## 2022-08-20 RX ORDER — SODIUM CHLORIDE 0.9 % (FLUSH) 0.9 %
5-40 SYRINGE (ML) INJECTION AS NEEDED
Status: DISCONTINUED | OUTPATIENT
Start: 2022-08-20 | End: 2022-08-20

## 2022-08-20 RX ORDER — LABETALOL HYDROCHLORIDE 5 MG/ML
40 INJECTION, SOLUTION INTRAVENOUS
Status: COMPLETED | OUTPATIENT
Start: 2022-08-20 | End: 2022-08-20

## 2022-08-20 RX ORDER — SODIUM CHLORIDE, SODIUM LACTATE, POTASSIUM CHLORIDE, CALCIUM CHLORIDE 600; 310; 30; 20 MG/100ML; MG/100ML; MG/100ML; MG/100ML
INJECTION, SOLUTION INTRAVENOUS
Status: DISCONTINUED | OUTPATIENT
Start: 2022-08-20 | End: 2022-08-20 | Stop reason: HOSPADM

## 2022-08-20 RX ORDER — OXYTOCIN/RINGER'S LACTATE 20/1000 ML
PLASTIC BAG, INJECTION (ML) INTRAVENOUS
Status: DISCONTINUED | OUTPATIENT
Start: 2022-08-20 | End: 2022-08-20 | Stop reason: HOSPADM

## 2022-08-20 RX ORDER — FUROSEMIDE 40 MG/1
20 TABLET ORAL DAILY
Status: DISCONTINUED | OUTPATIENT
Start: 2022-08-21 | End: 2022-08-22

## 2022-08-20 RX ORDER — IBUPROFEN 800 MG/1
800 TABLET ORAL EVERY 8 HOURS
Status: DISCONTINUED | OUTPATIENT
Start: 2022-08-20 | End: 2022-08-23 | Stop reason: HOSPADM

## 2022-08-20 RX ORDER — KETOROLAC TROMETHAMINE 30 MG/ML
INJECTION, SOLUTION INTRAMUSCULAR; INTRAVENOUS AS NEEDED
Status: DISCONTINUED | OUTPATIENT
Start: 2022-08-20 | End: 2022-08-20 | Stop reason: HOSPADM

## 2022-08-20 RX ORDER — ACETAMINOPHEN 325 MG/1
650 TABLET ORAL
Status: DISCONTINUED | OUTPATIENT
Start: 2022-08-20 | End: 2022-08-23 | Stop reason: HOSPADM

## 2022-08-20 RX ORDER — NIFEDIPINE 30 MG/1
60 TABLET, EXTENDED RELEASE ORAL DAILY
Status: DISCONTINUED | OUTPATIENT
Start: 2022-08-21 | End: 2022-08-23 | Stop reason: HOSPADM

## 2022-08-20 RX ORDER — NALOXONE HYDROCHLORIDE 0.4 MG/ML
0.4 INJECTION, SOLUTION INTRAMUSCULAR; INTRAVENOUS; SUBCUTANEOUS AS NEEDED
Status: DISCONTINUED | OUTPATIENT
Start: 2022-08-20 | End: 2022-08-23 | Stop reason: HOSPADM

## 2022-08-20 RX ORDER — ZOLPIDEM TARTRATE 5 MG/1
5 TABLET ORAL
Status: DISCONTINUED | OUTPATIENT
Start: 2022-08-20 | End: 2022-08-23 | Stop reason: HOSPADM

## 2022-08-20 RX ORDER — ONDANSETRON 4 MG/1
4 TABLET, ORALLY DISINTEGRATING ORAL
Status: DISCONTINUED | OUTPATIENT
Start: 2022-08-20 | End: 2022-08-23 | Stop reason: HOSPADM

## 2022-08-20 RX ORDER — BUPIVACAINE HYDROCHLORIDE 7.5 MG/ML
INJECTION, SOLUTION EPIDURAL; RETROBULBAR
Status: SHIPPED | OUTPATIENT
Start: 2022-08-20 | End: 2022-08-20

## 2022-08-20 RX ORDER — FAMOTIDINE 10 MG/ML
INJECTION INTRAVENOUS AS NEEDED
Status: DISCONTINUED | OUTPATIENT
Start: 2022-08-20 | End: 2022-08-20 | Stop reason: HOSPADM

## 2022-08-20 RX ADMIN — FAMOTIDINE 20 MG: 10 INJECTION INTRAVENOUS at 13:53

## 2022-08-20 RX ADMIN — LABETALOL HYDROCHLORIDE 40 MG: 5 INJECTION, SOLUTION INTRAVENOUS at 09:00

## 2022-08-20 RX ADMIN — SODIUM CHLORIDE 300 MCG: 9 INJECTION, SOLUTION INTRAVENOUS at 14:35

## 2022-08-20 RX ADMIN — Medication 30 UNITS/HR: at 14:36

## 2022-08-20 RX ADMIN — KETOROLAC TROMETHAMINE 30 MG: 30 INJECTION, SOLUTION INTRAMUSCULAR at 14:46

## 2022-08-20 RX ADMIN — MORPHINE SULFATE 0.2 MG: 0.5 INJECTION, SOLUTION EPIDURAL; INTRATHECAL; INTRAVENOUS at 14:10

## 2022-08-20 RX ADMIN — Medication 87.3 MILLI-UNITS/MIN: at 16:45

## 2022-08-20 RX ADMIN — ONDANSETRON 4 MG: 2 INJECTION INTRAMUSCULAR; INTRAVENOUS at 13:53

## 2022-08-20 RX ADMIN — SODIUM CHLORIDE, POTASSIUM CHLORIDE, SODIUM LACTATE AND CALCIUM CHLORIDE: 600; 310; 30; 20 INJECTION, SOLUTION INTRAVENOUS at 13:54

## 2022-08-20 RX ADMIN — LABETALOL HYDROCHLORIDE 600 MG: 200 TABLET, FILM COATED ORAL at 21:50

## 2022-08-20 RX ADMIN — SODIUM CHLORIDE, POTASSIUM CHLORIDE, SODIUM LACTATE AND CALCIUM CHLORIDE 125 ML/HR: 600; 310; 30; 20 INJECTION, SOLUTION INTRAVENOUS at 06:00

## 2022-08-20 RX ADMIN — DEXAMETHASONE SODIUM PHOSPHATE 4 MG: 4 INJECTION, SOLUTION INTRA-ARTICULAR; INTRALESIONAL; INTRAMUSCULAR; INTRAVENOUS; SOFT TISSUE at 13:53

## 2022-08-20 RX ADMIN — SODIUM CHLORIDE 50 MCG/MIN: 9 INJECTION, SOLUTION INTRAVENOUS at 14:18

## 2022-08-20 RX ADMIN — LABETALOL HYDROCHLORIDE 600 MG: 200 TABLET, FILM COATED ORAL at 09:15

## 2022-08-20 RX ADMIN — BUPIVACAINE HYDROCHLORIDE 11.25 MG: 7.5 INJECTION, SOLUTION EPIDURAL; RETROBULBAR at 14:10

## 2022-08-20 RX ADMIN — SODIUM CHLORIDE, POTASSIUM CHLORIDE, SODIUM LACTATE AND CALCIUM CHLORIDE 100 ML/HR: 600; 310; 30; 20 INJECTION, SOLUTION INTRAVENOUS at 16:45

## 2022-08-20 RX ADMIN — METOCLOPRAMIDE HYDROCHLORIDE 10 MG: 5 INJECTION INTRAMUSCULAR; INTRAVENOUS at 13:53

## 2022-08-20 RX ADMIN — LABETALOL HYDROCHLORIDE 40 MG: 5 INJECTION INTRAVENOUS at 06:44

## 2022-08-20 RX ADMIN — LABETALOL HYDROCHLORIDE 600 MG: 200 TABLET, FILM COATED ORAL at 17:01

## 2022-08-20 RX ADMIN — IBUPROFEN 800 MG: 800 TABLET, FILM COATED ORAL at 21:05

## 2022-08-20 RX ADMIN — CEFAZOLIN 3 G: 10 INJECTION, POWDER, FOR SOLUTION INTRAVENOUS at 13:53

## 2022-08-20 RX ADMIN — LABETALOL HYDROCHLORIDE 20 MG: 5 INJECTION INTRAVENOUS at 05:53

## 2022-08-20 RX ADMIN — NIFEDIPINE 60 MG: 30 TABLET, FILM COATED, EXTENDED RELEASE ORAL at 21:05

## 2022-08-20 RX ADMIN — SODIUM CHLORIDE 300 MCG: 9 INJECTION, SOLUTION INTRAVENOUS at 14:15

## 2022-08-20 RX ADMIN — FUROSEMIDE 20 MG: 40 TABLET ORAL at 21:50

## 2022-08-20 NOTE — PROGRESS NOTES
0700 bedside report received from Terri Pemberton, CLAY.      CRNA at bedside assessing patient. 2440 Dr. Luciana Piedra notified of elevated blood pressures. Order received for Labetalol 41 mg IV NOW, and to continue with Labetalol 600 mg PO as ordered. 5369 Dr. Luciana Piedra at bedside. 1105 patient sitting up at side of bed-- MD aware of limited ability for FHR tracing. Patient's mother at bedside. 1223 Patient resting in bed with eyes closed. this RN at bedside adjusting [address]. FHR found with doppler. Unable to trace with US.     1350 Patient to OR for . 1530 Patient back in room from . Patients mother & S/O at bedside. 1700 order received per Dr. Luciana Piedra to give Labetalol PO has ordered. 1745 pericare provided for patient, chux pad changed, peripad changed. Patient denies any pain at this time. Infant in crib at bedside.

## 2022-08-20 NOTE — PROGRESS NOTES
Progress Note    Patient: Kailee Austin MRN: [id number]  SSN: [id number]    YOB: 1995  Age: 27 y.o. Sex: female      Admit Date: 8/18/2022    LOS: 1 day     Subjective:     No Complaints    Objective:     Vitals:    08/19/22 2150 08/19/22 2154 08/19/22 2200 08/19/22 2216   BP:  136/72  133/60   Pulse:  96  98   Resp:    26   Temp:       SpO2: 97%  96%    Weight:       Height:            Intake and Output:  Current Shift: No intake/output data recorded. Last three shifts: 08/18 0701 - 08/19 1900  In: 950 [I.V.:950]  Out: 1425 [Urine:1425]    Physical Exam:   Abd:Gravid  NST reactive  Cx: Posterior and closed    Lab/Data Review: All lab results for the last 24 hours reviewed. Assessment:     Active Problems:    Hypertension affecting pregnancy (8/15/2022)      Pregnancy (8/18/2022)      Preeclampsia complicating hypertension (8/19/2022)        Plan:     [facility] options desires to proceed with LTCS. Anest. and staff aware of plan.  OR doing emergent cases- given infant is stable will allow pt to eat, then plan on LTCS in AM- pt understands and agrees    Signed By: Rosita Hamilton MD     August 19, 2022

## 2022-08-20 NOTE — PROGRESS NOTES
Operative Report    Patient: Samara Saint MRN: [id number]  SSN: [id number]    YOB: 1995  Age: 27 y.o. Sex: female       Date of Surgery: 2022     Preoperative Diagnosis: Failure to Progress, PIH, [id number] cc    Postoperative Diagnosis: same    Surgeon(s) and Role:     Peewee Nix MD - Primary    Anesthesia: Spinal     Procedure: Procedure(s):   SECTION     Estimated Blood Loss:  261OD    Complications: None    Drains: Mehta    Specimens: none    Surgical Findings: Normal uterus tubes and ovaries, LBI delivered in normal VTX fashion, 3V Cord, Nl Placenta, Abxs Given, Cord Blood drawn, Peds and Resp present at delivery. Omental adhesions to anterior abdominal wall, difficult case due to maternal body habitus and difficult visualization    Counts: Sponge and needle counts were correct times two.     Signed By:  Lupis Crowe MD     2022

## 2022-08-20 NOTE — ANESTHESIA PREPROCEDURE EVALUATION
Relevant Problems   No relevant active problems       Anesthetic History   No history of anesthetic complications            Review of Systems / Medical History  Patient summary reviewed, nursing notes reviewed and pertinent labs reviewed    Pulmonary        Sleep apnea: No treatment  Undiagnosed apnea         Neuro/Psych   Within defined limits           Cardiovascular    Hypertension              Exercise tolerance: <4 METS  Comments: Pre Eclampsia, Super Morbid Obese   GI/Hepatic/Renal  Within defined limits              Endo/Other  Within defined limits           Other Findings              Physical Exam    Airway  Mallampati: II  TM Distance: 4 - 6 cm  Neck ROM: normal range of motion   Mouth opening: Normal     Cardiovascular    Rhythm: regular  Rate: normal         Dental  No notable dental hx       Pulmonary                 Abdominal  Abdominal exam normal       Other Findings            Anesthetic Plan    ASA: 3  Anesthesia type: spinal and general - backup    Monitoring Plan: Continuous noninvasive hemodynamic monitoring  Post-op pain plan if not by surgeon: intrathecal opiates      Anesthetic plan and risks discussed with: Patient

## 2022-08-20 NOTE — PROGRESS NOTES
1901: Bedside shift report received from Quin Kwon RN. Pt breastfeeding infant at time of report. Denies pain. 1945: Dr. Cheryl Mckeon MD called. Dosing for PP Labetalol and Nifedipine confirmed. New orders to remove montero catheter tomorrow (8/21/22) AM between 7-8 AM.     2200: Educated pt regarding hand expression and spoon feeding infant. Completed catheter care. 2220: Pt transferred to [address] [place] [address] room 306. Bedside shift report given to Filippo Patel RN. Pt resting comfortably at time of report.

## 2022-08-20 NOTE — ANESTHESIA PROCEDURE NOTES
Spinal Block    Start time: 8/20/2022 1:58 PM  End time: 8/20/2022 2:18 PM  Performed by: Lyndsay Henderson CRNA  Authorized by: Olegario Dumont MD     Pre-procedure:   Indications: at surgeon's request, procedure for pain and primary anesthetic  Preanesthetic Checklist: patient identified, risks and benefits discussed, anesthesia consent, site marked, patient being monitored, timeout performed and fire risk safety assessment completed and verbalized    Timeout Time: 13:58 EDT      Spinal Block:   Patient Position:  Seated  Prep Region:  Lumbar  Prep: chlorhexidine      Location:  L4-5  Technique:  Single shot  Local: bupivacaine (PF) (MARCAINE) 0.75 % (7.5 mg/mL) Intrathecal - Intrathecal   11.25 mg - 8/20/2022 2:10:00 PM  morphine (PF) (DURAMORPH;ASTRAMORPH) 0.5 mg/mL injection - Intrathecal   0.2 mg - 8/20/2022 2:10:00 PM    Med Admin Time: 8/20/2022 2:10 PM    Needle:   Needle Type:  Pencan  Needle Gauge:  22 G  Attempts:  3 or more      Events: CSF confirmed        Assessment:  Insertion:  Uncomplicated  Patient tolerance:  Patient tolerated the procedure well with no immediate complications

## 2022-08-20 NOTE — PROGRESS NOTES
Progress Note    Patient: Ludwig Harada MRN: [id number]  SSN: [id number]    YOB: 1995  Age: 27 y.o. Sex: female      Admit Date: 8/18/2022    LOS: 2 days     Subjective:     No Complaints, denies any HA or visual changes    Objective:     Vitals:    08/20/22 0750 08/20/22 0755 08/20/22 0800 08/20/22 0801   BP:    (!) 160/86   Pulse:    91   Resp:       Temp:       SpO2: 97% 96% 96%    Weight:       Height:            Intake and Output:  Current Shift: No intake/output data recorded. Last three shifts: 08/18 1901 - 08/20 0700  In: 950 [I.V.:950]  Out: 2755 [Urine:2755]    Physical Exam:   Abd:Gravid, NST reactive    Lab/Data Review: All lab results for the last 24 hours reviewed.          Assessment:     Active Problems:    Hypertension affecting pregnancy (8/15/2022)      Pregnancy (8/18/2022)      Preeclampsia complicating hypertension (8/19/2022)        Plan:     Plan for primary LTCS this AM due to failed IOL/failure to progress    Signed By: Kellie Aguilar MD     August 20, 2022

## 2022-08-21 LAB
BASOPHILS # BLD: 0 K/UL (ref 0–0.1)
BASOPHILS NFR BLD: 0 % (ref 0–1)
DIFFERENTIAL METHOD BLD: ABNORMAL
EOSINOPHIL # BLD: 0 K/UL (ref 0–0.4)
EOSINOPHIL NFR BLD: 0 % (ref 0–7)
ERYTHROCYTE [DISTWIDTH] IN BLOOD BY AUTOMATED COUNT: 15.1 % (ref 11.5–14.5)
HCT VFR BLD AUTO: 27.8 % (ref 35–47)
HGB BLD-MCNC: 9 G/DL (ref 11.5–16)
IMM GRANULOCYTES # BLD AUTO: 0.1 K/UL (ref 0–0.04)
IMM GRANULOCYTES NFR BLD AUTO: 1 % (ref 0–0.5)
LYMPHOCYTES # BLD: 2.6 K/UL (ref 0.8–3.5)
LYMPHOCYTES NFR BLD: 20 % (ref 12–49)
MCH RBC QN AUTO: 27.4 PG (ref 26–34)
MCHC RBC AUTO-ENTMCNC: 32.4 G/DL (ref 30–36.5)
MCV RBC AUTO: 84.8 FL (ref 80–99)
MONOCYTES # BLD: 1.3 K/UL (ref 0–1)
MONOCYTES NFR BLD: 10 % (ref 5–13)
NEUTS SEG # BLD: 9.2 K/UL (ref 1.8–8)
NEUTS SEG NFR BLD: 69 % (ref 32–75)
NRBC # BLD: 0 K/UL (ref 0–0.01)
NRBC BLD-RTO: 0 PER 100 WBC
PLATELET # BLD AUTO: 272 K/UL (ref 150–400)
PMV BLD AUTO: 10.5 FL (ref 8.9–12.9)
RBC # BLD AUTO: 3.28 M/UL (ref 3.8–5.2)
WBC # BLD AUTO: 13.2 K/UL (ref 3.6–11)

## 2022-08-21 PROCEDURE — 74011250637 HC RX REV CODE- 250/637: Performed by: OBSTETRICS & GYNECOLOGY

## 2022-08-21 PROCEDURE — 74011000250 HC RX REV CODE- 250: Performed by: OBSTETRICS & GYNECOLOGY

## 2022-08-21 PROCEDURE — 36415 COLL VENOUS BLD VENIPUNCTURE: CPT

## 2022-08-21 PROCEDURE — 85025 COMPLETE CBC W/AUTO DIFF WBC: CPT

## 2022-08-21 PROCEDURE — 65410000002 HC RM PRIVATE OB

## 2022-08-21 RX ORDER — LABETALOL 300 MG/1
TABLET, FILM COATED ORAL
Qty: 180 TABLET | Refills: 1 | Status: SHIPPED | OUTPATIENT
Start: 2022-08-21

## 2022-08-21 RX ORDER — OXYCODONE HYDROCHLORIDE 5 MG/1
5 TABLET ORAL
Qty: 28 TABLET | Refills: 0 | Status: SHIPPED | OUTPATIENT
Start: 2022-08-21 | End: 2022-08-24

## 2022-08-21 RX ORDER — FUROSEMIDE 40 MG/1
TABLET ORAL
Qty: 14 TABLET | Refills: 0 | Status: SHIPPED | OUTPATIENT
Start: 2022-08-22 | End: 2022-10-07

## 2022-08-21 RX ORDER — IBUPROFEN 800 MG/1
800 TABLET ORAL EVERY 8 HOURS
Qty: 60 TABLET | Refills: 0 | Status: SHIPPED | OUTPATIENT
Start: 2022-08-21 | End: 2022-10-07

## 2022-08-21 RX ORDER — NIFEDIPINE 60 MG/1
60 TABLET, EXTENDED RELEASE ORAL DAILY
Qty: 30 TABLET | Refills: 2 | Status: SHIPPED | OUTPATIENT
Start: 2022-08-22

## 2022-08-21 RX ADMIN — LABETALOL HYDROCHLORIDE 600 MG: 200 TABLET, FILM COATED ORAL at 21:54

## 2022-08-21 RX ADMIN — LABETALOL HYDROCHLORIDE 600 MG: 200 TABLET, FILM COATED ORAL at 08:37

## 2022-08-21 RX ADMIN — IBUPROFEN 800 MG: 800 TABLET, FILM COATED ORAL at 21:54

## 2022-08-21 RX ADMIN — IBUPROFEN 800 MG: 800 TABLET, FILM COATED ORAL at 13:08

## 2022-08-21 RX ADMIN — SODIUM CHLORIDE, PRESERVATIVE FREE 10 ML: 5 INJECTION INTRAVENOUS at 13:18

## 2022-08-21 RX ADMIN — OXYCODONE 5 MG: 5 TABLET ORAL at 03:13

## 2022-08-21 RX ADMIN — FUROSEMIDE 20 MG: 40 TABLET ORAL at 08:38

## 2022-08-21 RX ADMIN — NIFEDIPINE 60 MG: 30 TABLET, FILM COATED, EXTENDED RELEASE ORAL at 08:38

## 2022-08-21 RX ADMIN — ACETAMINOPHEN 650 MG: 325 TABLET, FILM COATED ORAL at 00:49

## 2022-08-21 RX ADMIN — IBUPROFEN 800 MG: 800 TABLET, FILM COATED ORAL at 05:59

## 2022-08-21 RX ADMIN — ACETAMINOPHEN 650 MG: 325 TABLET, FILM COATED ORAL at 18:44

## 2022-08-21 RX ADMIN — LABETALOL HYDROCHLORIDE 600 MG: 200 TABLET, FILM COATED ORAL at 15:44

## 2022-08-21 NOTE — PROGRESS NOTES
Progress Note    Patient: Dominic Burleson MRN: [id number]  SSN: [id number]    YOB: 1995  Age: 27 y.o. Sex: female      Admit Date: 8/18/2022    LOS: 3 days     Subjective:     No Complaints    Objective:     Vitals:    08/21/22 0235 08/21/22 0433 08/21/22 0608 08/21/22 0837   BP: 117/72  126/73 (!) (P) 150/97   Pulse: 95  (!) 106 (!) (P) 107   Resp: 18 16 18    Temp: 98.6 °F (37 °C)  98.3 °F (36.8 °C)    SpO2: 98%  99%    Weight:       Height:            Intake and Output:  Current Shift: No intake/output data recorded. Last three shifts: 08/19 1901 - 08/21 0700  In: 1800 [I.V.:1800]  Out: 3905 [Urine:3405]    Physical Exam:   Abd: FF  Incision CDI    Lab/Data Review: All lab results for the last 24 hours reviewed.          Assessment:     Active Problems:    Hypertension affecting pregnancy (8/15/2022)      Pregnancy (8/18/2022)      Preeclampsia complicating hypertension (8/19/2022)        Plan:     Routine PP orders: PPD/POD # 1 LTCS  BP stable on current PO Meds    Signed By: Guy Rosales MD     August 21, 2022

## 2022-08-21 NOTE — PROGRESS NOTES
Problem: Patient Education: Go to Patient Education Activity  Goal: Patient/Family Education  Outcome: Resolved/Met     Problem: Vaginal Delivery: Day of Deliver-Laboring  Goal: Off Pathway (Use only if patient is Off Pathway)  Outcome: Resolved/Met  Goal: Activity/Safety  Outcome: Resolved/Met  Goal: Consults, if ordered  Outcome: Resolved/Met  Goal: Diagnostic Test/Procedures  Outcome: Resolved/Met  Goal: Nutrition/Diet  Outcome: Resolved/Met  Goal: Discharge Planning  Outcome: Resolved/Met  Goal: Medications  Outcome: Resolved/Met  Goal: Respiratory  Outcome: Resolved/Met  Goal: Treatments/Interventions/Procedures  Outcome: Resolved/Met  Goal: *Vital signs within defined limits  Outcome: Resolved/Met  Goal: *Labs within defined limits  Outcome: Resolved/Met  Goal: *Hemodynamically stable  Outcome: Resolved/Met  Goal: *Optimal pain control at patient's stated goal  Outcome: Resolved/Met     Problem: Vaginal Delivery: Day of Delivery-Post delivery  Goal: Off Pathway (Use only if patient is Off Pathway)  Outcome: Resolved/Met  Goal: Activity/Safety  Outcome: Resolved/Met  Goal: Consults, if ordered  Outcome: Resolved/Met  Goal: Nutrition/Diet  Outcome: Resolved/Met  Goal: Discharge Planning  Outcome: Resolved/Met  Goal: Medications  Outcome: Resolved/Met  Goal: Treatments/Interventions/Procedures  Outcome: Resolved/Met  Goal: *Vital signs within defined limits  Outcome: Resolved/Met  Goal: *Labs within defined limits  Outcome: Resolved/Met  Goal: *Hemodynamically stable  Outcome: Resolved/Met  Goal: *Optimal pain control at patient's stated goal  Outcome: Resolved/Met  Goal: *Participates in infant care  Outcome: Resolved/Met  Goal: *Demonstrates progressive activity  Outcome: Resolved/Met  Goal: *Tolerating diet  Outcome: Resolved/Met     Problem: Vaginal Delivery: Postpartum Day 1  Goal: Off Pathway (Use only if patient is Off Pathway)  Outcome: Resolved/Met  Goal: Activity/Safety  Outcome: Resolved/Met  Goal: Consults, if ordered  Outcome: Resolved/Met  Goal: Diagnostic Test/Procedures  Outcome: Resolved/Met  Goal: Nutrition/Diet  Outcome: Resolved/Met  Goal: Discharge Planning  Outcome: Resolved/Met  Goal: Medications  Outcome: Resolved/Met  Goal: Treatments/Interventions/Procedures  Outcome: Resolved/Met  Goal: Psychosocial  Outcome: Resolved/Met  Goal: *Vital signs within defined limits  Outcome: Resolved/Met  Goal: *Labs within defined limits  Outcome: Resolved/Met  Goal: *Hemodynamically stable  Outcome: Resolved/Met  Goal: *Optimal pain control at patient's stated goal  Outcome: Resolved/Met  Goal: *Participates in infant care  Outcome: Resolved/Met  Goal: *Demonstrates progressive activity  Outcome: Resolved/Met  Goal: *Performs self perineal care  Outcome: Resolved/Met  Goal: *Appropriate parent-infant bonding  Outcome: Resolved/Met  Goal: *Tolerating diet  Outcome: Resolved/Met  Goal: *Performs self breast care  Outcome: Resolved/Met     Problem: Vaginal Delivery: Postpartum 2  Goal: Off Pathway (Use only if patient is Off Pathway)  Outcome: Resolved/Met  Goal: Activity/Safety  Outcome: Resolved/Met  Goal: Consults, if ordered  Outcome: Resolved/Met  Goal: Nutrition/Diet  Outcome: Resolved/Met  Goal: Discharge Planning  Outcome: Resolved/Met  Goal: Medications  Outcome: Resolved/Met  Goal: Treatments/Interventions/Procedures  Outcome: Resolved/Met  Goal: Psychosocial  Outcome: Resolved/Met     Problem: Vaginal Delivery: Discharge Outcomes  Goal: *Verbalizes name, dosage, time, side effects, and number of days to continue medications  Outcome: Resolved/Met  Goal: *Describes available resources and support systems  Outcome: Resolved/Met  Goal: *No signs and symptoms of infection  Outcome: Resolved/Met  Goal: *Birth certificate information completed  Outcome: Resolved/Met  Goal: *Received and verbalizes understanding of discharge plan and instructions  Outcome: Resolved/Met  Goal: *Vital signs within defined limits  Outcome: Resolved/Met  Goal: *Labs within defined limits  Outcome: Resolved/Met  Goal: *Hemodynamically stable  Outcome: Resolved/Met  Goal: *Optimal pain control at patient's stated goal  Outcome: Resolved/Met  Goal: *Participates in infant care  Outcome: Resolved/Met  Goal: *Demonstrates progressive activity  Outcome: Resolved/Met  Goal: *Appropriate parent-infant bonding  Outcome: Resolved/Met  Goal: *Tolerating diet  Outcome: Resolved/Met     Problem: Pressure Injury - Risk of  Goal: *Prevention of pressure injury  Description: Document Kamlesh Scale and appropriate interventions in the flowsheet. Outcome: Resolved/Met     Problem: Patient Education: Go to Patient Education Activity  Goal: Patient/Family Education  Outcome: Resolved/Met     Problem: Falls - Risk of  Goal: *Absence of Falls  Description: Document Naya Fall Risk and appropriate interventions in the flowsheet.   Outcome: Resolved/Met     Problem: Patient Education: Go to Patient Education Activity  Goal: Patient/Family Education  Outcome: Resolved/Met     Problem: Patient Education: Go to Patient Education Activity  Goal: Patient/Family Education  Outcome: Resolved/Met     Problem:  Delivery: Day of Delivery  Goal: Off Pathway (Use only if patient is Off Pathway)  Outcome: Resolved/Met  Goal: Activity/Safety  Outcome: Resolved/Met  Goal: Consults, if ordered  Outcome: Resolved/Met  Goal: Diagnostic Test/Procedures  Outcome: Resolved/Met  Goal: Nutrition/Diet  Outcome: Resolved/Met  Goal: Discharge Planning  Outcome: Resolved/Met  Goal: Medications  Outcome: Resolved/Met  Goal: Respiratory  Outcome: Resolved/Met  Goal: Treatments/Interventions/Procedures  Outcome: Resolved/Met  Goal: Psychosocial  Outcome: Resolved/Met  Goal: *Vital signs within defined limits  Outcome: Resolved/Met  Goal: *Labs within defined limits  Outcome: Resolved/Met  Goal: *Hemodynamically stable  Outcome: Resolved/Met  Goal: *Optimal pain control at patient's stated goal  Outcome: Resolved/Met  Goal: *Participates in infant care  Outcome: Resolved/Met  Goal: *Demonstrates progressive activity  Outcome: Resolved/Met  Goal: *Tolerating diet  Outcome: Resolved/Met     Problem:  Delivery: Postpartum Day 1  Goal: Off Pathway (Use only if patient is Off Pathway)  Outcome: Resolved/Met  Goal: Activity/Safety  Outcome: Resolved/Met  Goal: Consults, if ordered  Outcome: Resolved/Met  Goal: Diagnostic Test/Procedures  Outcome: Resolved/Met  Goal: Nutrition/Diet  Outcome: Resolved/Met  Goal: Discharge Planning  Outcome: Resolved/Met  Goal: Medications  Outcome: Resolved/Met  Goal: Respiratory  Outcome: Resolved/Met  Goal: Treatments/Interventions/Procedures  Outcome: Resolved/Met  Goal: Psychosocial  Outcome: Resolved/Met  Goal: *Vital signs within defined limits  Outcome: Resolved/Met  Goal: *Labs within defined limits  Outcome: Resolved/Met  Goal: *Hemodynamically stable  Outcome: Resolved/Met  Goal: *Optimal pain control at patient's stated goal  Outcome: Resolved/Met  Goal: *Participates in infant care  Outcome: Resolved/Met  Goal: *Demonstrates progressive activity  Outcome: Resolved/Met  Goal: *Tolerating diet  Outcome: Resolved/Met  Goal: *Performs self perineal care  Outcome: Resolved/Met     Problem:  Delivery: Postpartum Day 2  Goal: Off Pathway (Use only if patient is Off Pathway)  Outcome: Resolved/Met  Goal: Activity/Safety  Outcome: Resolved/Met  Goal: Consults, if ordered  Outcome: Resolved/Met  Goal: Nutrition/Diet  Outcome: Resolved/Met  Goal: Discharge Planning  Outcome: Resolved/Met  Goal: Medications  Outcome: Resolved/Met  Goal: Treatments/Interventions/Procedures  Outcome: Resolved/Met  Goal: Psychosocial  Outcome: Resolved/Met  Goal: *Vital signs within defined limits  Outcome: Resolved/Met  Goal: *Labs within defined limits  Outcome: Resolved/Met  Goal: *Hemodynamically stable  Outcome: Resolved/Met  Goal: *Optimal pain control at patient's stated goal  Outcome: Resolved/Met  Goal: *Participates in infant care  Outcome: Resolved/Met  Goal: *Demonstrates progressive activity  Outcome: Resolved/Met  Goal: *Appropriate parent-infant bonding  Outcome: Resolved/Met  Goal: *Tolerating diet  Outcome: Resolved/Met     Problem:  Delivery: Postpartum Day 3  Goal: Off Pathway (Use only if patient is Off Pathway)  Outcome: Resolved/Met  Goal: Activity/Safety  Outcome: Resolved/Met  Goal: Consults, if ordered  Outcome: Resolved/Met  Goal: Nutrition/Diet  Outcome: Resolved/Met  Goal: Discharge Planning  Outcome: Resolved/Met  Goal: Medications  Outcome: Resolved/Met  Goal: Treatments/Interventions/Procedures  Outcome: Resolved/Met  Goal: Psychosocial  Outcome: Resolved/Met     Problem:  Delivery: Discharge Outcomes  Goal: *Follow-up appointments as indicated  Outcome: Resolved/Met  Goal: *Describes available resources and support systems  Outcome: Resolved/Met  Goal: *No signs and symptoms of infection  Outcome: Resolved/Met  Goal: *Birth certificate information completed  Outcome: Resolved/Met  Goal: *Received and verbalizes understanding of discharge plan and instructions  Outcome: Resolved/Met  Goal: *Vital signs within defined limits  Outcome: Resolved/Met  Goal: *Labs within defined limits  Outcome: Resolved/Met  Goal: *Hemodynamically stable  Outcome: Resolved/Met  Goal: *Optimal pain control at patient's stated goal  Outcome: Resolved/Met  Goal: *Participates in infant care  Outcome: Resolved/Met  Goal: *Demonstrates progressive activity  Outcome: Resolved/Met  Goal: *Appropriate parent-infant bonding  Outcome: Resolved/Met  Goal: *Tolerating diet  Outcome: Resolved/Met  Goal: *Verbalizes name, dosage, time, side effects, and number of days to continue medications  Outcome: Resolved/Met  Goal: *Influenza vaccine administered (October-March)  Outcome: Resolved/Met

## 2022-08-21 NOTE — PROGRESS NOTES
Problem: Patient Education: Go to Patient Education Activity  Goal: Patient/Family Education  Outcome: Progressing Towards Goal     Problem:  Delivery: Day of Delivery  Goal: Off Pathway (Use only if patient is Off Pathway)  Outcome: Progressing Towards Goal  Goal: Activity/Safety  Outcome: Progressing Towards Goal  Goal: Consults, if ordered  Outcome: Progressing Towards Goal  Goal: Diagnostic Test/Procedures  Outcome: Progressing Towards Goal  Goal: Nutrition/Diet  Outcome: Progressing Towards Goal  Goal: Discharge Planning  Outcome: Progressing Towards Goal  Goal: Medications  Outcome: Progressing Towards Goal  Goal: Respiratory  Outcome: Progressing Towards Goal  Goal: Treatments/Interventions/Procedures  Outcome: Progressing Towards Goal  Goal: Psychosocial  Outcome: Progressing Towards Goal  Goal: *Vital signs within defined limits  Outcome: Progressing Towards Goal  Goal: *Labs within defined limits  Outcome: Progressing Towards Goal  Goal: *Hemodynamically stable  Outcome: Progressing Towards Goal  Goal: *Optimal pain control at patient's stated goal  Outcome: Progressing Towards Goal  Goal: *Participates in infant care  Outcome: Progressing Towards Goal  Goal: *Demonstrates progressive activity  Outcome: Progressing Towards Goal  Goal: *Tolerating diet  Outcome: Progressing Towards Goal     Problem:  Delivery: Postpartum Day 1  Goal: Off Pathway (Use only if patient is Off Pathway)  Outcome: Progressing Towards Goal  Goal: Activity/Safety  Outcome: Progressing Towards Goal  Goal: Consults, if ordered  Outcome: Progressing Towards Goal  Goal: Diagnostic Test/Procedures  Outcome: Progressing Towards Goal  Goal: Nutrition/Diet  Outcome: Progressing Towards Goal  Goal: Discharge Planning  Outcome: Progressing Towards Goal  Goal: Medications  Outcome: Progressing Towards Goal  Goal: Respiratory  Outcome: Progressing Towards Goal  Goal: Treatments/Interventions/Procedures  Outcome: Progressing Towards Goal  Goal: Psychosocial  Outcome: Progressing Towards Goal  Goal: *Vital signs within defined limits  Outcome: Progressing Towards Goal  Goal: *Labs within defined limits  Outcome: Progressing Towards Goal  Goal: *Hemodynamically stable  Outcome: Progressing Towards Goal  Goal: *Optimal pain control at patient's stated goal  Outcome: Progressing Towards Goal  Goal: *Participates in infant care  Outcome: Progressing Towards Goal  Goal: *Demonstrates progressive activity  Outcome: Progressing Towards Goal  Goal: *Tolerating diet  Outcome: Progressing Towards Goal  Goal: *Performs self perineal care  Outcome: Progressing Towards Goal     Problem:  Delivery: Postpartum Day 2  Goal: Off Pathway (Use only if patient is Off Pathway)  Outcome: Progressing Towards Goal  Goal: Activity/Safety  Outcome: Progressing Towards Goal  Goal: Consults, if ordered  Outcome: Progressing Towards Goal  Goal: Nutrition/Diet  Outcome: Progressing Towards Goal  Goal: Discharge Planning  Outcome: Progressing Towards Goal  Goal: Medications  Outcome: Progressing Towards Goal  Goal: Treatments/Interventions/Procedures  Outcome: Progressing Towards Goal  Goal: Psychosocial  Outcome: Progressing Towards Goal  Goal: *Vital signs within defined limits  Outcome: Progressing Towards Goal  Goal: *Labs within defined limits  Outcome: Progressing Towards Goal  Goal: *Hemodynamically stable  Outcome: Progressing Towards Goal  Goal: *Optimal pain control at patient's stated goal  Outcome: Progressing Towards Goal  Goal: *Participates in infant care  Outcome: Progressing Towards Goal  Goal: *Demonstrates progressive activity  Outcome: Progressing Towards Goal  Goal: *Appropriate parent-infant bonding  Outcome: Progressing Towards Goal  Goal: *Tolerating diet  Outcome: Progressing Towards Goal     Problem:  Delivery: Postpartum Day 3  Goal: Off Pathway (Use only if patient is Off Pathway)  Outcome: Progressing Towards Goal  Goal: Activity/Safety  Outcome: Progressing Towards Goal  Goal: Consults, if ordered  Outcome: Progressing Towards Goal  Goal: Nutrition/Diet  Outcome: Progressing Towards Goal  Goal: Discharge Planning  Outcome: Progressing Towards Goal  Goal: Medications  Outcome: Progressing Towards Goal  Goal: Treatments/Interventions/Procedures  Outcome: Progressing Towards Goal  Goal: Psychosocial  Outcome: Progressing Towards Goal     Problem:  Delivery: Discharge Outcomes  Goal: *Follow-up appointments as indicated  Outcome: Progressing Towards Goal  Goal: *Describes available resources and support systems  Outcome: Progressing Towards Goal  Goal: *No signs and symptoms of infection  Outcome: Progressing Towards Goal  Goal: *Birth certificate information completed  Outcome: Progressing Towards Goal  Goal: *Received and verbalizes understanding of discharge plan and instructions  Outcome: Progressing Towards Goal  Goal: *Vital signs within defined limits  Outcome: Progressing Towards Goal  Goal: *Labs within defined limits  Outcome: Progressing Towards Goal  Goal: *Hemodynamically stable  Outcome: Progressing Towards Goal  Goal: *Optimal pain control at patient's stated goal  Outcome: Progressing Towards Goal  Goal: *Participates in infant care  Outcome: Progressing Towards Goal  Goal: *Demonstrates progressive activity  Outcome: Progressing Towards Goal  Goal: *Appropriate parent-infant bonding  Outcome: Progressing Towards Goal  Goal: *Tolerating diet  Outcome: Progressing Towards Goal  Goal: *Verbalizes name, dosage, time, side effects, and number of days to continue medications  Outcome: Progressing Towards Goal  Goal: *Influenza vaccine administered (October-March)  Outcome: Progressing Towards Goal     Problem: Falls - Risk of  Goal: *Absence of Falls  Description: Document Naya Fall Risk and appropriate interventions in the flowsheet.   Outcome: Progressing Towards Goal  Note: Fall Risk Interventions: Medication Interventions: Patient to call before getting OOB    Elimination Interventions: Call light in reach              Problem: Patient Education: Go to Patient Education Activity  Goal: Patient/Family Education  Outcome: Progressing Towards Goal

## 2022-08-21 NOTE — PROGRESS NOTES
TRANSFER - IN REPORT: [id number]    Verbal report received from [facility]  being received from Labor and Delivery for routine post - op      Report consisted of patients Situation, Background, Assessment and   Recommendations(SBAR). Information from the following report(s) SBAR was reviewed with the receiving nurse. Opportunity for questions and clarification was provided. Assessment completed upon patients arrival to unit and care assumed. Pt transferred to unit from Labor and Delivery. Oriented to room. Pt updated regarding bedside report and hourly rounds. Instructed to call for Nurse before first time ambulating. PP and Ozan care packet explained at bedside. Pt given [employer] and birth certificate sheet. Significant other at bedside.             Problem: Patient Education: Go to Patient Education Activity  Goal: Patient/Family Education  Outcome: Progressing Towards Goal     Problem:  Delivery: Day of Delivery  Goal: Off Pathway (Use only if patient is Off Pathway)  Outcome: Progressing Towards Goal  Goal: Activity/Safety  Outcome: Progressing Towards Goal  Goal: Consults, if ordered  Outcome: Progressing Towards Goal  Goal: Diagnostic Test/Procedures  Outcome: Progressing Towards Goal  Goal: Nutrition/Diet  Outcome: Progressing Towards Goal  Goal: Discharge Planning  Outcome: Progressing Towards Goal  Goal: Medications  Outcome: Progressing Towards Goal  Goal: Respiratory  Outcome: Progressing Towards Goal  Goal: Treatments/Interventions/Procedures  Outcome: Progressing Towards Goal  Goal: Psychosocial  Outcome: Progressing Towards Goal  Goal: *Vital signs within defined limits  Outcome: Progressing Towards Goal  Goal: *Labs within defined limits  Outcome: Progressing Towards Goal  Goal: *Hemodynamically stable  Outcome: Progressing Towards Goal  Goal: *Optimal pain control at patient's stated goal  Outcome: Progressing Towards Goal  Goal: *Participates in infant care  Outcome: Progressing Towards Goal  Goal: *Demonstrates progressive activity  Outcome: Progressing Towards Goal  Goal: *Tolerating diet  Outcome: Progressing Towards Goal     Problem: Falls - Risk of  Goal: *Absence of Falls  Description: Document Naya Fall Risk and appropriate interventions in the flowsheet.   Outcome: Progressing Towards Goal  Note: Fall Risk Interventions:            Medication Interventions: Patient to call before getting OOB    Elimination Interventions: Call light in reach              Problem: Patient Education: Go to Patient Education Activity  Goal: Patient/Family Education  Outcome: Progressing Towards Goal

## 2022-08-21 NOTE — OP NOTES
[facility]  OPERATIVE REPORT    Name:  Paul Wright  MR#:  [id number]  :  1995  ACCOUNT #:  [de-identified]  DATE OF SERVICE:  2022    PREOPERATIVE DIAGNOSES:  1. Intrauterine pregnancy at 35 weeks. 2.  Preeclampsia complicating hypertension. 3.  Obesity. 4.  Failure to progress. 5.  Cephalopelvic disproportion. POSTOPERATIVE DIAGNOSES:  1. Intrauterine pregnancy at 35 weeks. 2.  Preeclampsia complicating hypertension. 3.  Obesity. 4.  Failure to progress. 5.  Cephalopelvic disproportion. PROCEDURE PERFORMED:  Primary low-transverse . SURGEON:  Mary Beth Atkins MD    ASSISTANT:  Tia Nance. ANESTHESIA:  Spinal.    COMPLICATIONS:  None. SPECIMENS REMOVED:  None. IMPLANTS:  none. ESTIMATED BLOOD LOSS:  500 mL. DRAINS:  Mehta catheter. Clear urine. SURGICAL FINDINGS:  Normal uterus, tubes, and ovaries. Liveborn infant, delivered normal vertex fashion. Three-vessel cord. Normal-appearing placenta. Antibiotics given. Cord blood drawn, but not sent due to the patient's blood type being A positive. Peds and Respiratory were present at the time of delivery. There were omental adhesions to the anterior abdominal wall from a previous surgery. Difficult case due to maternal body habitus and difficult visualization due to this. PROCEDURE:  The patient was taken to the operating room after an informed consent had been reviewed. She was administered spinal anesthesia and then she was placed in the supine position, left lateral tilt. Her abdomen was retracted, and then she was prepped and draped in normal sterile fashion. A time-out was performed by me. After this was done, her anesthesia was found to be adequate. A Pfannenstiel skin incision was made through her previous incision, taken down to the underlying layer of fascia. Fascia was nicked in the midline.   The fascial incision was then extended laterally using the curved Marin scissors. Superior margin of the fascia was grasped with Kocher clamps x2, and the rectus muscles were dissected off both sharply and bluntly. On the patient's right side, there was some bleeding from the rectus muscle. This was controlled with a hemostat and the Bovie both inferiorly and superiorly. After that was done, further dissection of the superior margin was done. After this was done, attention was then turned inferiorly where the fascia was grasped with Kocher clamps and the rectus muscle was dissect off both sharply and bluntly in similar fashion. After this was done, the rectus muscles were  in the midline. Peritoneum was identified. Minimal adhesions were encountered. Upon entering into the abdomen, these were clamped with the Belinda clamp and then cauterized using the Bovie and . After this was done, the peritoneum was extended both by sharp and blunt dissection. The bladder blade was placed. The intestines due to the patient's habitus and gestational age were coming into the surgical field, so three lap pads were utilized to pack away the intestines. A Parker retractor was also used. A bladder flap was then created. The lower uterine segment was scored in a horizontal orientation using the scalpel. It was nicked in the midline. Clear fluid was noted. The uterine incision was then extended laterally using blunt dissection. The infant's head was brought into the uterine incision site while applying fundal pressure. The infant was delivered. Infant was suctioned. The cord was clamped x2 and cut. The infant was handed off to the awaiting staff. Cord blood was drawn but was not sent due to the patient's blood type being A positive. The placenta was then delivered. The uterus was cleared off any remaining blood clot and tissue. After this was done, bladder blade was replaced along with the Mabelene Skains retractor.   The uterine incision was grasped with Jacob Jones clamps. Uterine incision was then reapproximated using a 0 Vicryl in a running lock fashion. Several 0 Vicryl imbricating stitches were placed. Good hemostasis was achieved. After this was done, all areas were inspected. Any remaining laps and instruments were removed from the abdomen. Interceed was placed over the incision to hopefully prevent any future adhesions. After this was done, the anterior peritoneum was grasped and reapproximated using a 2-0 Vicryl in a running fashion. After this was closed, the fascia was then reapproximated using a 0 Vicryl in a running fashion. There was a little oozing toward the midline from where the fascial incision was made and the rectus muscle. This was controlled by the Bovie, and a piece of Surgicel was placed over this to help with hemostasis. The fascia was then finished being closed. After this was done, the subcuticular fat was inspected, and hemostasis was achieved with the Bovie. It was reapproximated in an interrupted fashion using a 2-0 plain gut. Skin was then closed using a 4-0 Monocryl in a subcuticular fashion. Dressings were placed. Sponge, lap, and needle counts were correct x2. The patient tolerated the procedure without complications and was taken to the recovery room in a stable condition.         MD CARLOTTA [email or web address]  D:  08/20/2022 18:00  T:  08/21/2022 1:32  JOB #:  [id number]

## 2022-08-22 PROCEDURE — 65410000002 HC RM PRIVATE OB

## 2022-08-22 PROCEDURE — 74011250637 HC RX REV CODE- 250/637: Performed by: OBSTETRICS & GYNECOLOGY

## 2022-08-22 RX ADMIN — NIFEDIPINE 60 MG: 30 TABLET, FILM COATED, EXTENDED RELEASE ORAL at 08:14

## 2022-08-22 RX ADMIN — IBUPROFEN 800 MG: 800 TABLET, FILM COATED ORAL at 21:15

## 2022-08-22 RX ADMIN — FUROSEMIDE 20 MG: 40 TABLET ORAL at 08:15

## 2022-08-22 RX ADMIN — IBUPROFEN 800 MG: 800 TABLET, FILM COATED ORAL at 13:48

## 2022-08-22 RX ADMIN — LABETALOL HYDROCHLORIDE 600 MG: 200 TABLET, FILM COATED ORAL at 08:14

## 2022-08-22 RX ADMIN — ACETAMINOPHEN 650 MG: 325 TABLET, FILM COATED ORAL at 04:06

## 2022-08-22 RX ADMIN — LABETALOL HYDROCHLORIDE 600 MG: 200 TABLET, FILM COATED ORAL at 16:22

## 2022-08-22 RX ADMIN — IBUPROFEN 800 MG: 800 TABLET, FILM COATED ORAL at 05:39

## 2022-08-22 RX ADMIN — LABETALOL HYDROCHLORIDE 600 MG: 200 TABLET, FILM COATED ORAL at 21:15

## 2022-08-22 NOTE — PROGRESS NOTES
Problem: Patient Education: Go to Patient Education Activity  Goal: Patient/Family Education  Outcome: Progressing Towards Goal     Problem:  Delivery: Postpartum Day 2  Goal: Off Pathway (Use only if patient is Off Pathway)  Outcome: Progressing Towards Goal  Goal: Activity/Safety  Outcome: Progressing Towards Goal  Goal: Consults, if ordered  Outcome: Progressing Towards Goal  Goal: Nutrition/Diet  Outcome: Progressing Towards Goal  Goal: Discharge Planning  Outcome: Progressing Towards Goal  Goal: Medications  Outcome: Progressing Towards Goal  Goal: Treatments/Interventions/Procedures  Outcome: Progressing Towards Goal  Goal: Psychosocial  Outcome: Progressing Towards Goal  Goal: *Vital signs within defined limits  Outcome: Progressing Towards Goal  Goal: *Labs within defined limits  Outcome: Progressing Towards Goal  Goal: *Hemodynamically stable  Outcome: Progressing Towards Goal  Goal: *Optimal pain control at patient's stated goal  Outcome: Progressing Towards Goal  Goal: *Participates in infant care  Outcome: Progressing Towards Goal  Goal: *Demonstrates progressive activity  Outcome: Progressing Towards Goal  Goal: *Appropriate parent-infant bonding  Outcome: Progressing Towards Goal  Goal: *Tolerating diet  Outcome: Progressing Towards Goal     Problem:  Delivery: Postpartum Day 3  Goal: Off Pathway (Use only if patient is Off Pathway)  Outcome: Progressing Towards Goal  Goal: Activity/Safety  Outcome: Progressing Towards Goal  Goal: Consults, if ordered  Outcome: Progressing Towards Goal  Goal: Nutrition/Diet  Outcome: Progressing Towards Goal  Goal: Discharge Planning  Outcome: Progressing Towards Goal  Goal: Medications  Outcome: Progressing Towards Goal  Goal: Treatments/Interventions/Procedures  Outcome: Progressing Towards Goal  Goal: Psychosocial  Outcome: Progressing Towards Goal     Problem:  Delivery: Discharge Outcomes  Goal: *Follow-up appointments as indicated  Outcome: Progressing Towards Goal  Goal: *Describes available resources and support systems  Outcome: Progressing Towards Goal  Goal: *No signs and symptoms of infection  Outcome: Progressing Towards Goal  Goal: *Birth certificate information completed  Outcome: Progressing Towards Goal  Goal: *Received and verbalizes understanding of discharge plan and instructions  Outcome: Progressing Towards Goal  Goal: *Vital signs within defined limits  Outcome: Progressing Towards Goal  Goal: *Labs within defined limits  Outcome: Progressing Towards Goal  Goal: *Hemodynamically stable  Outcome: Progressing Towards Goal  Goal: *Optimal pain control at patient's stated goal  Outcome: Progressing Towards Goal  Goal: *Participates in infant care  Outcome: Progressing Towards Goal  Goal: *Demonstrates progressive activity  Outcome: Progressing Towards Goal  Goal: *Appropriate parent-infant bonding  Outcome: Progressing Towards Goal  Goal: *Tolerating diet  Outcome: Progressing Towards Goal  Goal: *Verbalizes name, dosage, time, side effects, and number of days to continue medications  Outcome: Progressing Towards Goal  Goal: *Influenza vaccine administered (October-March)  Outcome: Progressing Towards Goal     Problem: Falls - Risk of  Goal: *Absence of Falls  Description: Document Naya Fall Risk and appropriate interventions in the flowsheet.   Outcome: Progressing Towards Goal  Note: Fall Risk Interventions:            Medication Interventions: Teach patient to arise slowly    Elimination Interventions: Call light in reach              Problem: Patient Education: Go to Patient Education Activity  Goal: Patient/Family Education  Outcome: Progressing Towards Goal

## 2022-08-22 NOTE — PROGRESS NOTES
Problem: Patient Education: Go to Patient Education Activity  Goal: Patient/Family Education  Outcome: Progressing Towards Goal     Problem:  Delivery: Postpartum Day 2  Goal: Off Pathway (Use only if patient is Off Pathway)  Outcome: Progressing Towards Goal  Goal: Activity/Safety  Outcome: Progressing Towards Goal  Goal: Consults, if ordered  Outcome: Progressing Towards Goal  Goal: Nutrition/Diet  Outcome: Progressing Towards Goal  Goal: Discharge Planning  Outcome: Progressing Towards Goal  Goal: Medications  Outcome: Progressing Towards Goal  Goal: Treatments/Interventions/Procedures  Outcome: Progressing Towards Goal  Goal: Psychosocial  Outcome: Progressing Towards Goal  Goal: *Vital signs within defined limits  Outcome: Progressing Towards Goal  Goal: *Labs within defined limits  Outcome: Progressing Towards Goal  Goal: *Hemodynamically stable  Outcome: Progressing Towards Goal  Goal: *Optimal pain control at patient's stated goal  Outcome: Progressing Towards Goal  Goal: *Participates in infant care  Outcome: Progressing Towards Goal  Goal: *Demonstrates progressive activity  Outcome: Progressing Towards Goal  Goal: *Appropriate parent-infant bonding  Outcome: Progressing Towards Goal  Goal: *Tolerating diet  Outcome: Progressing Towards Goal     Problem: Falls - Risk of  Goal: *Absence of Falls  Description: Document Naya Fall Risk and appropriate interventions in the flowsheet.   Outcome: Progressing Towards Goal  Note: Fall Risk Interventions:            Medication Interventions: Teach patient to arise slowly    Elimination Interventions: Call light in reach              Problem: Patient Education: Go to Patient Education Activity  Goal: Patient/Family Education  Outcome: Progressing Towards Goal

## 2022-08-22 NOTE — PROGRESS NOTES
1905: Shift assessment completed. Patient educated to notify healthcare provider if bleeding saturates one peripad within one hour and/or if passing clots larger than an egg size. Patient also has Mommy Manual for reference. Patient also encouraged to drink at least 2L of fluid daily (4 [facility] water pitchers) to prevent constipation. Patient voiced understanding and denies having any questions at this time.

## 2022-08-22 NOTE — PROGRESS NOTES
Post-op/Partum Day Number 2 Progress Note    Danielito Valdez       Information for the patient's :  Kapil Blank [[id number]]   , Low Transverse      Patient doing well without significant complaint. Breast/bottle feeding. Light lochia. OOB and ambulating. Voiding without difficulty. Tolerating reg diet. +Flatus. Review of Systems   Constitutional:  Negative for chills and fever. Respiratory:  Negative for shortness of breath. Cardiovascular:  Negative for chest pain. Gastrointestinal:  Negative for diarrhea, nausea and vomiting. Vitals:  Visit Vitals  BP (!) 144/98   Pulse (!) 121   Temp 98 °F (36.7 °C)   Resp 18   Ht 5' 4\" (1.626 m)   Wt 335 lb (152 kg)   LMP 2021 (LMP Unknown)   SpO2 98%   Breastfeeding Unknown   BMI 57.50 kg/m²     Temp (24hrs), Av °F (36.7 °C), Min:97.8 °F (36.6 °C), Max:98.5 °F (36.9 °C)        Exam:   Patient without distress     Normal resp effort                Abdomen soft, fundus firm 3 FB above umbilicus, appropriately TTP; inc c/d/i                Lower extremities are negative for cords or tenderness.  LE 2+ edema to knees b/l    Labs:     Lab Results   Component Value Date/Time    WBC 13.2 (H) 2022 04:29 AM    WBC 11.7 (H) 2022 12:15 PM    WBC 9.4 08/15/2022 10:30 AM    WBC 10.3 2022 05:24 PM    WBC 9.9 2022 11:53 AM    WBC 11.7 (H) 2022 12:15 PM    WBC 11.0 (H) 2022 11:31 AM    WBC 14.9 (H) 10/17/2021 01:45 PM    HGB 9.0 (L) 2022 04:29 AM    HGB 11.0 (L) 2022 12:15 PM    HGB 11.2 (L) 08/15/2022 10:30 AM    HGB 10.7 (L) 2022 05:24 PM    HGB 10.5 (L) 2022 11:53 AM    HGB 10.9 (L) 2022 12:15 PM    HGB 12.2 2022 11:31 AM    HGB 12.0 10/17/2021 01:45 PM    HCT 27.8 (L) 2022 04:29 AM    HCT 33.6 (L) 2022 12:15 PM    HCT 33.1 (L) 08/15/2022 10:30 AM    HCT 32.7 (L) 2022 05:24 PM    HCT 31.1 (L) 2022 11:53 AM    HCT 33.5 (L) 2022 12:15 PM    HCT 36.9 02/18/2022 11:31 AM    HCT 37.6 10/17/2021 01:45 PM    PLATELET 998 53/77/7279 04:29 AM    PLATELET 000 42/58/2257 12:15 PM    PLATELET 471 64/00/3057 10:30 AM    PLATELET 124 16/99/0760 05:24 PM    PLATELET 200 96/63/9314 11:53 AM    PLATELET 828 98/03/1515 12:15 PM    PLATELET 825 39/79/0502 11:31 AM    PLATELET 193 (H) 52/33/6382 01:45 PM       No results found for this or any previous visit (from the past 24 hour(s)). Assessment: POD 2 s/p s/p PCS     ICD-10-CM ICD-9-CM    1. Preeclampsia complicating hypertension  O11.9 642.70 oxyCODONE IR (ROXICODONE) 5 mg immediate release tablet           Plan:  1. Continue routine postpartum/post op care. 2. CHTN with superimposed pre-E with severe features- Bps improves on Labetalol 600mg po TID and Nifedipine 60mg po daily. Will d/c Lasix as Bps improved, and clinical edema not severe. 3. Discharge planning: desires d/c home tomorrow as baby is still admitted.

## 2022-08-23 VITALS
TEMPERATURE: 97.6 F | SYSTOLIC BLOOD PRESSURE: 146 MMHG | HEART RATE: 107 BPM | WEIGHT: 293 LBS | RESPIRATION RATE: 18 BRPM | HEIGHT: 64 IN | BODY MASS INDEX: 50.02 KG/M2 | DIASTOLIC BLOOD PRESSURE: 92 MMHG | OXYGEN SATURATION: 98 %

## 2022-08-23 LAB
BACTERIA SPEC CULT: ABNORMAL
BACTERIA SPEC CULT: ABNORMAL
COLONY COUNT,CNT: ABNORMAL
SPECIAL REQUESTS,SREQ: ABNORMAL

## 2022-08-23 PROCEDURE — 99024 POSTOP FOLLOW-UP VISIT: CPT | Performed by: OBSTETRICS & GYNECOLOGY

## 2022-08-23 PROCEDURE — 74011250636 HC RX REV CODE- 250/636: Performed by: OBSTETRICS & GYNECOLOGY

## 2022-08-23 PROCEDURE — 90707 MMR VACCINE SC: CPT | Performed by: OBSTETRICS & GYNECOLOGY

## 2022-08-23 PROCEDURE — 74011250637 HC RX REV CODE- 250/637: Performed by: OBSTETRICS & GYNECOLOGY

## 2022-08-23 RX ADMIN — LABETALOL HYDROCHLORIDE 600 MG: 200 TABLET, FILM COATED ORAL at 09:33

## 2022-08-23 RX ADMIN — MEASLES, MUMPS, AND RUBELLA VIRUS VACCINE LIVE 0.5 ML: 1000; 12500; 1000 INJECTION, POWDER, LYOPHILIZED, FOR SUSPENSION SUBCUTANEOUS at 11:19

## 2022-08-23 RX ADMIN — NIFEDIPINE 60 MG: 30 TABLET, FILM COATED, EXTENDED RELEASE ORAL at 09:33

## 2022-08-23 RX ADMIN — IBUPROFEN 800 MG: 800 TABLET, FILM COATED ORAL at 05:13

## 2022-08-23 NOTE — PROGRESS NOTES
Post-Operative  Day 3    Ashley Cabral     Information for the patient's :  Emperatriz Mendoza [[id number]]   , Low Transverse  Patient doing well without significant complaint. Tolerating diet, passing flatus, voiding and ambulating without difficulty    Vitals:  Visit Vitals  BP (!) 145/93 (BP 1 Location: Right upper arm, BP Patient Position: Sitting)   Pulse (!) 111   Temp 98 °F (36.7 °C)   Resp 18   Ht 5' 4\" (1.626 m)   Wt 335 lb (152 kg)   SpO2 99%   Breastfeeding Unknown   BMI 57.50 kg/m²     Temp (24hrs), Av.2 °F (36.8 °C), Min:98 °F (36.7 °C), Max:98.6 °F (37 °C)        Exam:        Patient without distress. Abdomen, bowel sounds present, soft, expected tenderness, fundus firm                Wound incision clean, dry and intact               Lower extremities are negative for swelling, cords or tenderness.     Labs:   Lab Results   Component Value Date/Time    WBC 13.2 (H) 2022 04:29 AM    WBC 11.7 (H) 2022 12:15 PM    WBC 9.4 08/15/2022 10:30 AM    WBC 10.3 2022 05:24 PM    WBC 9.9 2022 11:53 AM    WBC 11.7 (H) 2022 12:15 PM    WBC 11.0 (H) 2022 11:31 AM    WBC 14.9 (H) 10/17/2021 01:45 PM    HGB 9.0 (L) 2022 04:29 AM    HGB 11.0 (L) 2022 12:15 PM    HGB 11.2 (L) 08/15/2022 10:30 AM    HGB 10.7 (L) 2022 05:24 PM    HGB 10.5 (L) 2022 11:53 AM    HGB 10.9 (L) 2022 12:15 PM    HGB 12.2 2022 11:31 AM    HGB 12.0 10/17/2021 01:45 PM    HCT 27.8 (L) 2022 04:29 AM    HCT 33.6 (L) 2022 12:15 PM    HCT 33.1 (L) 08/15/2022 10:30 AM    HCT 32.7 (L) 2022 05:24 PM    HCT 31.1 (L) 2022 11:53 AM    HCT 33.5 (L) 2022 12:15 PM    HCT 36.9 2022 11:31 AM    HCT 37.6 10/17/2021 01:45 PM    PLATELET 598 86/10/9908 04:29 AM    PLATELET 294  12:15 PM    PLATELET 901  10:30 AM    PLATELET 908  05:24 PM    PLATELET 802  11:53 AM    PLATELET 726 66/65/5614 12:15 PM    PLATELET 317 44/04/4765 11:31 AM    PLATELET 362 (H) 81/12/8280 01:45 PM       No results found for this or any previous visit (from the past 24 hour(s)). Assessment: Post-Op day 3, doing well      Plan:   1. Discharge home today  2. Follow up in office in 1 weeks with Trenia Harada, MD  3. Post partum activity advised, diet as tolerated  4.  Discharge Medications: ibuprofen, percocet and medications prior to admission

## 2022-08-23 NOTE — DISCHARGE SUMMARY
Name: Karina Alfredo MRN: [id number]  SSN: [id number]    YOB: 1995  Age: 27 y.o. Sex: female      Admit Date: 2022    Discharge Date: 2022     Admitting Physician: Topher Smith MD     Attending Physician:  Camron Lara MD     Admission Diagnoses: Hypertension affecting pregnancy [O16.9]; Pregnancy [Z34.90]    Discharge Diagnoses:   Information for the patient's :  Kana Guthrie [[id number]]   Delivery of a 4 lb 14.3 oz (2.22 kg) female infant via , Low Transverse on 2022 at 2:35 PM  by Chelsie Archer. Apgars were 8  and 9 . Additional Diagnoses:   Hospital Problems  Date Reviewed: 8/15/2022            Codes Class Noted POA    Preeclampsia complicating hypertension ICD-10-CM: O11.9  ICD-9-CM: 642.70  2022 Unknown        Pregnancy ICD-10-CM: Z34.90  ICD-9-CM: V22.2  2022 Unknown        Hypertension affecting pregnancy ICD-10-CM: O16.9  ICD-9-CM: 642.90  8/15/2022 Unknown          Lab Results   Component Value Date/Time    ABO/Rh(D) A Positive 2022 12:02 PM       Immunization(s): There is no immunization history for the selected administration types on file for this patient. Hospital Course: Normal hospital course following the delivery. Patient Instructions:   Current Discharge Medication List        START taking these medications    Details   ibuprofen (MOTRIN) 800 mg tablet Take 1 Tablet by mouth every eight (8) hours. Qty: 60 Tablet, Refills: 0      oxyCODONE IR (ROXICODONE) 5 mg immediate release tablet Take 1 Tablet by mouth every four (4) hours as needed for Pain for up to 3 days.  Max Daily Amount: 30 mg.  Qty: 28 Tablet, Refills: 0    Associated Diagnoses: Preeclampsia complicating hypertension      furosemide (LASIX) 40 mg tablet Take 1 tablet PO every other day as needed for Lower extremity edema  Qty: 14 Tablet, Refills: 0           CONTINUE these medications which have CHANGED    Details   labetaloL (NORMODYNE) 300 mg tablet Take 2 tablets PO TID  Qty: 180 Tablet, Refills: 1      NIFEdipine ER (PROCARDIA XL) 60 mg ER tablet Take 1 Tablet by mouth daily. Qty: 30 Tablet, Refills: 2           CONTINUE these medications which have NOT CHANGED    Details   aspirin delayed-release 81 mg tablet Take 1 Tablet by mouth daily. Qty: 90 Tablet, Refills: 0    Associated Diagnoses: Hypertension affecting pregnancy, antepartum      prenatal vit-calcium-iron-fa (Prenatal Plus, calcium carb,) 27 mg iron- 1 mg tab Take 1 Tablet by mouth daily. folic acid-vit Q6-NSO C57 (FOLBEE, AV-OLIVIA, VIRT-OLIVIA) 2.5-25-1 mg tablet Take 1 Tablet by mouth daily. Reference my discharge instructions. Follow-up Appointments   Procedures    FOLLOW UP VISIT Appointment in: 6 Weeks     Standing Status:   Standing     Number of Occurrences:   1     Order Specific Question:   Appointment in     Answer:   6 Weeks        Signed By:  Luis Lamas MD     August 23, 2022      \"[id number]\"     I spent 30 minutes or less with the patient to perform a final examination, discuss the hospital stay, give instructions for continuing care to all relevant caregivers and prepare discharge records, prescriptions and referral forms.

## 2022-08-23 NOTE — DISCHARGE INSTRUCTIONS
Nifedipine (By mouth)   Nifedipine (nqc-UUI-n-peen)  Treats high blood pressure and angina (chest pain). This medicine is a calcium channel blocker. Brand Name(s): Adalat CC, Afeditab CR, Nifedical XL, Procardia, Procardia XL   There may be other brand names for this medicine. When This Medicine Should Not Be Used: This medicine is not right for everyone. Do not use it if you had an allergic reaction to nifedipine. How to Use This Medicine:   Capsule, Liquid Filled Capsule, Long Acting Tablet  Take your medicine as directed. Your dose may need to be changed several times to find what works best for you. It is best to take this medicine on an empty stomach. Swallow the capsule or tablet whole. Do not break, crush, or chew it. If you take the extended-release tablet, part of the tablet may pass into your stools. This is normal and is nothing to worry about. Missed dose: Take a dose as soon as you remember. If it is almost time for your next dose, wait until then and take a regular dose. Do not take extra medicine to make up for a missed dose. Store the medicine in a closed container at room temperature, away from heat, moisture, and direct light. Drugs and Foods to Avoid:   Ask your doctor or pharmacist before using any other medicine, including over-the-counter medicines, vitamins, and herbal products. Some foods and medicines can affect how nifedipine works. Tell your doctor if you are using the following:   Cimetidine, clarithromycin, digoxin, erythromycin, fentanyl, fluconazole, fluoxetine, indinavir, itraconazole, nefazodone, nelfinavir, phenytoin, quinidine, ranitidine, saquinavir  Beta-blockers  Blood thinner (such as warfarin)  Do not eat grapefruit or drink grapefruit juice while you are using this medicine.   Warnings While Using This Medicine:   Tell your doctor if you are pregnant or breastfeeding, or if you have stomach problems, heart failure, coronary artery disease, or recently had a heart attack. This medicine may cause the following problems:   Heart failure  Worsening chest pain or increased risk of heart attack  Stomach or bowel blockage or ulcers  This medicine could lower your blood pressure too much, especially when you first use it or if you are dehydrated. Stand or sit up slowly if you feel lightheaded or dizzy. Do not stop using this medicine without asking your doctor, even if you feel well. This medicine will not cure high blood pressure, but it will help keep it in normal range. You may have to take blood pressure medicine for the rest of your life. Tell any doctor or dentist who treats you that you are using this medicine. You may need to stop using this medicine several days before you have surgery or medical tests. Tell any doctor or dentist who treats you that you are using this medicine. This medicine may affect certain medical test results. Your doctor will do lab tests at regular visits to check on the effects of this medicine. Keep all appointments. Keep all medicine out of the reach of children. Never share your medicine with anyone. Possible Side Effects While Using This Medicine:   Call your doctor right away if you notice any of these side effects: Allergic reaction: Itching or hives, swelling in your face or hands, swelling or tingling in your mouth or throat, chest tightness, trouble breathing  Blistering, peeling, red skin rash  Chest pain that may spread, trouble breathing, nausea, unusual sweating, fainting  Fast, pounding, or uneven heartbeat  Lightheadedness, dizziness, or fainting  Rapid weight gain, swelling in your hands, ankles, or feet  Stomach pain or bloating, nausea, vomiting, weight loss  If you notice these less serious side effects, talk with your doctor:   Headache  Muscle cramps or tremors  Warmth or redness in your face, neck, arms, or upper chest  If you notice other side effects that you think are caused by this medicine, tell your doctor.    Call your doctor for medical advice about side effects. You may report side effects to FDA at 9-806-UNK-7114  © 2017 [facility]– [place] Information is for End User's use only and may not be sold, redistributed or otherwise used for commercial purposes. The above information is an  only. It is not intended as medical advice for individual conditions or treatments. Talk to your doctor, nurse or pharmacist before following any medical regimen to see if it is safe and effective for you. Labetalol (By mouth)   Labetalol ([id number]-[id number]-lol)  Treats high blood pressure. This medicine is a beta blocker. Brand Name(s): Trandate   There may be other brand names for this medicine. When This Medicine Should Not Be Used: You should not use this medicine if you have had an allergic reaction to any type of beta blocker medicine (such as atenolol, metoprolol, propranolol, Corgard®, Inderal®, Lopressor®, Toprol®, Tenormin®), or if you have asthma or certain heart problems. Talk with your doctor about what these heart problems are. How to Use This Medicine:   Tablet  Your doctor will tell you how much of this medicine to take and how often. Do not take more medicine or take it more often than your doctor tells you to. It is best to take this medicine with food. If a dose is missed:   If you miss a dose or forget to take your medicine, take it as soon as you can. If your next dose is less than 8 hours away, wait until then to take the medicine and skip the missed dose. Do not use extra medicine to make up for a missed dose. How to Store and Dispose of This Medicine:   Store the medicine at room temperature, away from heat, moisture, and direct light. Keep all medicine out of the reach of children and never share your medicine with anyone. Drugs and Foods to Avoid:   Ask your doctor or pharmacist before using any other medicine, including over-the-counter medicines, vitamins, and herbal products.   Some drugs should not be taken together because they can interact. A drug interaction may cause mild to very serious medical problems. It can also make one of the drugs not work properly or make it too strong. There are many drugs that can interact with labetalol. This especially includes medicines for asthma, diabetes, chest pain (angina), or heart rhythm problems, or epinephrine to treat severe allergic reactions. Tell your doctor if you are using any of these medicines, or if you are using other medicines to treat your high blood pressure. Warnings While Using This Medicine:   Make sure your doctor knows if you are pregnant or breastfeeding, or if you have emphysema, bronchitis, liver disease, overactive thyroid, diabetes, pheochromocytoma, or severe allergic reactions. Do not stop using this medicine suddenly without asking your doctor, or you may develop life-threatening heart problems. You may need to slowly decrease your dose before stopping it completely. This medicine may raise or lower your blood sugar, and it may cover up symptoms of very low blood sugar (hypoglycemia). This medicine may make you dizzy. Avoid driving, using machines, or doing anything else that could be dangerous if you are not alert. Make sure any doctor or dentist who treats you knows that you are using this medicine. If you stop using this medicine, your blood pressure may go up. High blood pressure usually has no symptoms. Even if you feel well, do not stop using the medicine without asking your doctor.   Possible Side Effects While Using This Medicine:   Call your doctor right away if you notice any of these side effects:  Chest pain (may be related to your disease and not a side effect)  Fainting or severe dizziness  Slow, fast, or irregular heartbeat  Swelling of your feet or ankles  Unusual bleeding or bruising  Wheezing or trouble breathing  Yellow eyes or skin  If you notice these less serious side effects, talk with your doctor:   Cold hands and feet  Feeling dizzy, drowsy, or depressed  Trouble having sex  Trouble sleeping  Unusual tiredness or weakness  If you notice other side effects that you think are caused by this medicine, tell your doctor. Call your doctor for medical advice about side effects. You may report side effects to FDA at 6-598-AJA-8703  © 2017 [facility] Information is for End User's use only and may not be sold, redistributed or otherwise used for commercial purposes. The above information is an  only. It is not intended as medical advice for individual conditions or treatments. Talk to your doctor, nurse or pharmacist before following any medical regimen to see if it is safe and effective for you. Depression After Childbirth: Care Instructions  Overview     Many women get the \"baby blues\" during the first few days after childbirth. You may lose sleep, feel irritable, and cry easily. You may feel happy one minute and sad the next. Hormone changes are one cause of these emotional changes. Also, the demands of a new baby, along with visits from relatives or other family needs, can add to the stress. The \"baby blues\" often peak around the fourth day. Then they ease up in less than 2 weeks. If your moodiness or anxiety lasts for more than 2 weeks, or if you feel like life is not worth living, you may have postpartum depression. This is different for each person. Some mothers with serious depression may worry intensely about their infant's well-being. Others may feel distant from their child. Some mothers may even feel that they might harm their baby. Some may have signs of paranoia, wondering if someone is watching them. Depression is not a sign of weakness. It's a medical condition that requires treatment. Medicine and counseling often work well to reduce depression. Talk to your doctor about taking antidepressant medicine while breastfeeding.   Follow-up care is a key part of your treatment and safety. Be sure to make and go to all appointments, and call your doctor if you are having problems. It's also a good idea to know your test results and keep a list of the medicines you take. How do you know if you are depressed? With all the changes in your life, you may not know if you are depressed. Pregnancy sometimes causes changes in how you feel that are similar to the symptoms of depression. Symptoms of depression include:  Feeling sad or hopeless and losing interest in daily activities. These are the most common symptoms of depression. Sleeping too much or not enough. Feeling tired. You may feel as if you have no energy. Eating too much or too little. Writing or talking about death, such as writing suicide notes or talking about guns, knives, or pills. Keep the numbers for these national suicide hotlines: 7-787-078-TALK (0-861.989.7862) and 0-199-YZUTDAK (2-517.537.8169). If you or someone you know talks about suicide or feeling hopeless, get help right away. How can you care for yourself at home? Be safe with medicines. Take your medicines exactly as prescribed. Call your doctor if you think you are having a problem with your medicine. Eat a healthy diet so that you can keep up your energy. Get regular daily exercise, such as walks, to help improve your mood. Get as much sunlight as possible. Keep your shades and curtains open. Get outside as much as you can. Avoid using alcohol or other substances to feel better. Get as much rest and sleep as possible. Avoid doing too much. Being too tired can increase depression. Play stimulating music throughout your day and soothing music at night. Schedule outings and visits with friends and family. Ask them to call you regularly, so that you don't feel alone. Ask for help with preparing food and other daily tasks. Family and friends are often happy to help with a . Be honest with yourself and those who care about you. Tell them about your struggle. Join a support group of new mothers. No one can better understand the challenges of caring for a  than other new mothers. If you feel like life is not worth living or you're feeling hopeless, get help right away. Keep the numbers for these national suicide hotlines: 2-374-175-TALK (1-405.155.4984) and 0-020-FNTPITR (9-831.574.3990). When should you call for help? Call 911 anytime you think you may need emergency care. For example, call if:    You feel you cannot stop from hurting yourself, your baby, or someone else. Call your doctor now or seek immediate medical care if:    You are having trouble caring for yourself or your baby. You hear voices. Watch closely for changes in your health, and be sure to contact your doctor if:    You have problems with your depression medicine. You do not get better as expected. Where can you learn more? Go to http://www.gray.com/  Enter [id number] in the search box to learn more about \"Depression After Childbirth: Care Instructions. \"  Current as of: 2021               Content Version: 13.2  © 8599-3967 [employer]. Care instructions adapted under license by [employer] (which disclaims liability or warranty for this information). If you have questions about a medical condition or this instruction, always ask your healthcare professional. [place] [address] any warranty or liability for your use of this information.  Section: What to Expect at [address] [place] [address]     A  section, or , is surgery to deliver your baby through a cut that the doctor makes in your lower belly and uterus. The cut is called an incision. You may have some pain in your lower belly and need pain medicine for 1 to 2 weeks. You can expect some vaginal bleeding for several weeks. You will probably need about 6 weeks to fully recover.   It's important to take it easy while the incision heals. Avoid heavy lifting, strenuous activities, and exercises that strain the belly muscles while you recover. Ask a family member or friend for help with housework, cooking, and shopping. This care sheet gives you a general idea about how long it will take for you to recover. But each person recovers at a different pace. Follow the steps below to get better as quickly as possible. How can you care for yourself at home? Activity    Rest when you feel tired. Getting enough sleep will help you recover. Try to walk each day. Start by walking a little more than you did the day before. Bit by bit, increase the amount you walk. Walking boosts blood flow and helps prevent pneumonia, constipation, and blood clots. Avoid strenuous activities, such as bicycle riding, jogging, weightlifting, and aerobic exercise, for 6 weeks or until your doctor says it is okay. Until your doctor says it is okay, do not lift anything heavier than your baby. Do not do sit-ups or other exercises that strain the belly muscles for 6 weeks or until your doctor says it is okay. Hold a pillow over your incision when you cough or take deep breaths. This will support your belly and decrease your pain. You may shower as usual. Pat the incision dry when you are done. You will have some vaginal bleeding. Wear sanitary pads. Do not douche or use tampons until your doctor says it is okay. Ask your doctor when you can drive again. You will probably need to take at least 6 weeks off work. It depends on the type of work you do and how you feel. Ask your doctor when it is okay for you to have sex. Diet    You can eat your normal diet. If your stomach is upset, try bland, low-fat foods like plain rice, broiled chicken, toast, and yogurt. Drink plenty of fluids (unless your doctor tells you not to).      You may notice that your bowel movements are not regular right after your surgery. This is common. Try to avoid constipation and straining with bowel movements. You may want to take a fiber supplement every day. If you have not had a bowel movement after a couple of days, ask your doctor about taking a mild laxative. If you are breastfeeding, limit alcohol. Alcohol can cause a lack of energy and other health problems for the baby when a breastfeeding woman drinks heavily. It can also get in the way of a mom's ability to feed her baby or to care for the child in other ways. There isn't a lot of research about exactly how much alcohol can harm a baby. Having no alcohol is the safest choice for your baby. If you choose to have a drink now and then, have only one drink, and limit the number of occasions that you have a drink. Wait to breastfeed at least 2 hours after you have a drink to reduce the amount of alcohol the baby may get in the milk. Medicines    Your doctor will tell you if and when you can restart your medicines. You will also get instructions about taking any new medicines. If you take aspirin or some other blood thinner, ask your doctor if and when to start taking it again. Make sure that you understand exactly what your doctor wants you to do. Take pain medicines exactly as directed. If the doctor gave you a prescription medicine for pain, take it as prescribed. If you are not taking a prescription pain medicine, ask your doctor if you can take an over-the-counter medicine. If you think your pain medicine is making you sick to your stomach: Take your medicine after meals (unless your doctor has told you not to). Ask your doctor for a different pain medicine. If your doctor prescribed antibiotics, take them as directed. Do not stop taking them just because you feel better. You need to take the full course of antibiotics. Incision care    If you have strips of tape on the incision, leave the tape on for a week or until it falls off.      Wash the area daily with warm, soapy water, and pat it dry. Don't use hydrogen peroxide or alcohol, which can slow healing. You may cover the area with a gauze bandage if it weeps or rubs against clothing. Change the bandage every day. Keep the area clean and dry. Other instructions    If you breastfeed your baby, you may be more comfortable while you are healing if you don't rest your baby on your belly. Try tucking your baby under your arm, with your baby's body along the side you will be feeding on. Support your baby's upper body with your arm. With that hand you can control your baby's head to bring your baby's mouth to your breast. This is sometimes called the [place] hold. Follow-up care is a key part of your treatment and safety. Be sure to make and go to all appointments, and call your doctor if you are having problems. It's also a good idea to know your test results and keep a list of the medicines you take. When should you call for help? Share this information with your partner, family, or a friend. They can help you watch for warning signs. Call 911  anytime you think you may need emergency care. For example, call if:    You have thoughts of harming yourself, your baby, or another person. You passed out (lost consciousness). You have chest pain, are short of breath, or cough up blood. You have a seizure. Call your doctor now or seek immediate medical care if:    You have loose stitches, or your incision comes open. You have signs of hemorrhage (too much bleeding), such as:  Heavy vaginal bleeding. This means that you are soaking through one or more pads in an hour. Or you pass blood clots bigger than an egg. Feeling dizzy or lightheaded, or you feel like you may faint. Feeling so tired or weak that you cannot do your usual activities. A fast or irregular heartbeat. New or worse belly pain. You have symptoms of infection, such as: Increased pain, swelling, warmth, or redness.   Red streaks leading from the incision. Pus draining from the incision. A fever. Vaginal discharge that smells bad. New or worse belly pain. You have symptoms of a blood clot in your leg (called a deep vein thrombosis), such as:  Pain in your calf, back of the knee, thigh, or groin. Redness and swelling in your leg or groin. You have signs of preeclampsia, such as:  Sudden swelling of your face, hands, or feet. New vision problems (such as dimness, blurring, or seeing spots). A severe headache. Watch closely for changes in your health, and be sure to contact your doctor if:    Your vaginal bleeding isn't decreasing. You feel sad, anxious, or hopeless for more than a few days. You are having problems with your breasts or breastfeeding. Where can you learn more? Go to http://suzette-cory.info/  Enter M806 in the search box to learn more about \" Section: What to Expect at Home. \"  Current as of: 2021               Content Version: 13.2  © 2358-4824 [employer]. Care instructions adapted under license by [employer] (which disclaims liability or warranty for this information). If you have questions about a medical condition or this instruction, always ask your healthcare professional. [address] any warranty or liability for your use of this information. Breastfeeding: Care Instructions  Overview     Breastfeeding has many benefits. It may lower your baby's chances of getting an infection. It also may make it less likely that your baby will have problems such as diabetes and obesity later in life. Breastfeeding also helps you bond with your baby. In the first days after birth, your breasts make a thick, yellow liquid called colostrum. This liquid gives your baby nutrients and antibodies against infection. It is all that babies need in the first days after birth.  Your breasts will fill with milk a few days after the birth. Breastfeeding is a skill that gets better with practice. Be patient with yourself and your baby. If you have trouble, you can get help and keep breastfeeding. Follow-up care is a key part of your treatment and safety. Be sure to make and go to all appointments, and call your doctor if you are having problems. It's also a good idea to know your test results and keep a list of the medicines you take. How can you care for yourself at home? Breastfeed your baby whenever your baby is hungry. In the first 2 weeks, your baby will breastfeed at least 8 times in a 24-hour period. This will help you keep up your supply of milk. Signs that your baby is hungry include:  Sucking on their hands. Woods their lips. Turning their head toward your breast.  Put a bed pillow or a nursing pillow on your lap to support your arms and your baby. Hold your baby in a comfortable position. You can hold your baby in several ways. One of the most common positions is the cradle hold. One arm supports your baby, with your baby's head in the bend of your elbow. Your open hand supports your baby's bottom or back. Your baby's belly lies against yours. If you had your baby by , or , try the football hold. This position keeps your baby off your belly. Tuck your baby under your arm, with your baby's body along the side you will be feeding on. Support your baby's upper body with your arm. With that hand you can control your baby's head to bring their mouth to your breast.  Try different positions with each feeding. If you are having problems, ask for help from your doctor or a lactation consultant. To get your baby to latch on:  Support and narrow your breast with one hand using a \"U hold,\" with your thumb on the outer side of your breast and your fingers on the inner side. You can also use a \"C hold,\" with all your fingers below the nipple and your thumb above it.  Try the different holds to get the deepest latch for whichever breastfeeding position you use. Your other arm is behind your baby's back, with your hand supporting the base of the baby's head. Position your fingers and thumb to point toward your baby's ears. You can touch your baby's lower lip with your nipple to get your baby's mouth to open. Wait until your baby opens up really wide, like a big yawn. Then be sure to bring the baby quickly to your breast--not your breast to the baby. As you bring your baby toward your breast, use your other hand to support the breast and guide it into your baby's mouth. Both the nipple and a large portion of the darker area around the nipple (areola) should be in the baby's mouth. The baby's lips should be flared outward, not folded in (inverted). Listen for a regular sucking and swallowing pattern while the baby is feeding. If you can't see or hear a swallowing pattern, watch the baby's ears. They will wiggle slightly when the baby swallows. If the baby's nose appears to be blocked by your breast, bring your baby's body closer to you. This will help tilt the baby's head back slightly, so just the edge of one nostril is clear for breathing. When your baby is latched, you can usually remove your hand from supporting your breast and use it to cradle under your baby. Now just relax and breastfeed your baby. You will know that your baby is feeding well when: Your baby's mouth covers a lot of the areola, and the lips are flared out. Your baby's chin and nose rest against your breast.  Sucking is deep and rhythmic, with short pauses. You are able to see and hear your baby swallowing. You do not feel pain in your nipple. Offer both breasts to your baby at each feeding. Each time you breastfeed, switch which breast you start with. Anytime you need to remove your baby from the breast, put one finger in the corner of your baby's mouth.  Push your finger between your baby's gums to gently break the seal. If you don't break the tight seal before you remove your baby, your nipples can become sore, cracked, or bruised. After you finish feeding, gently pat your baby's back to let out any swallowed air. After your baby burps, offer the breast again, or offer the other breast. Sometimes a baby will want to keep feeding after being burped. When should you call for help? Call your doctor now or seek immediate medical care if:    You have symptoms of a breast infection, such as: Increased pain, swelling, redness, or warmth around a breast.  Red streaks extending from the breast.  Pus draining from a breast.  A fever. Your baby has no wet diapers for 6 hours. Watch closely for changes in your health, and be sure to contact your doctor if:    Your baby has trouble latching on to your breast.     You continue to have pain or discomfort when breastfeeding. You have other questions or concerns. Where can you learn more? Go to http://suzettePrintLess Planscory.info/  Enter P492 in the search box to learn more about \"Breastfeeding: Care Instructions. \"  Current as of: June 16, 2021               Content Version: 13.2  © 6895-1802 [email or web address]. Care instructions adapted under license by "[employer] (which disclaims liability or warranty for this information). If you have questions about a medical condition or this instruction, always ask your healthcare professional. [place] [address] any warranty or liability for your use of this information. Narcotic-Analgesic/Acetaminophen (Percocet, Norco, Lorcet HD, Lortab 10/325) - (By mouth)   Why this medicine is used:   Relieves pain.   Contact a nurse or doctor right away if you have:  Extreme weakness, shallow breathing, slow heartbeat  Severe confusion, lightheadedness, dizziness, fainting  Yellow skin or eyes, dark urine or pale stools  Severe constipation, severe stomach pain, nausea, vomiting, loss of appetite  Sweating or cold, clammy skin     Common side effects:  Mild constipation, nausea, vomiting  Sleepiness, tiredness  Itching, rash  © 2017 [facility] Information is for End User's use only and may not be sold, redistributed or otherwise used for commercial purposes.

## 2022-08-23 NOTE — PROGRESS NOTES
1310- pt wheeled to front entrance by writer with  in car seat in lap. Significant other carrying belongings. 1300- Pt educated on discharge instructions and prescriptions sent to preferred pharmacy. Aware of when next dose can be taken and warning signs to watch for/ notify MD. Significant other present at time of discharge. 1100- Clarified with Dora OZUNA that pt will not be continuing Lasix after discharge. Pt aware and will disregard script. Denisec aware of elevated AM BP and return to pt's normal BP range after medication given. Pt has been ranging around 140's during her stay. OB office contacted and 1 week BP and incision check scheduled with Dr. David Dan. Pt aware. 1015- Discharge plan of care validated with provider discharge order.        Problem:  Delivery: Postpartum Day 3  Goal: Off Pathway (Use only if patient is Off Pathway)  Outcome: Progressing Towards Goal  Goal: Activity/Safety  Outcome: Progressing Towards Goal  Goal: Consults, if ordered  Outcome: Progressing Towards Goal  Goal: Nutrition/Diet  Outcome: Progressing Towards Goal  Goal: Discharge Planning  Outcome: Progressing Towards Goal  Goal: Medications  Outcome: Progressing Towards Goal  Goal: Treatments/Interventions/Procedures  Outcome: Progressing Towards Goal  Goal: Psychosocial  Outcome: Progressing Towards Goal     Problem:  Delivery: Discharge Outcomes  Goal: *Follow-up appointments as indicated  Outcome: Progressing Towards Goal  Goal: *Describes available resources and support systems  Outcome: Progressing Towards Goal  Goal: *No signs and symptoms of infection  Outcome: Progressing Towards Goal  Goal: *Birth certificate information completed  Outcome: Progressing Towards Goal  Goal: *Received and verbalizes understanding of discharge plan and instructions  Outcome: Progressing Towards Goal  Goal: *Vital signs within defined limits  Outcome: Progressing Towards Goal  Goal: *Labs within defined limits  Outcome: Progressing Towards Goal  Goal: *Hemodynamically stable  Outcome: Progressing Towards Goal  Goal: *Optimal pain control at patient's stated goal  Outcome: Progressing Towards Goal  Goal: *Participates in infant care  Outcome: Progressing Towards Goal  Goal: *Demonstrates progressive activity  Outcome: Progressing Towards Goal  Goal: *Appropriate parent-infant bonding  Outcome: Progressing Towards Goal  Goal: *Tolerating diet  Outcome: Progressing Towards Goal  Goal: *Verbalizes name, dosage, time, side effects, and number of days to continue medications  Outcome: Progressing Towards Goal  Goal: *Influenza vaccine administered (October-March)  Outcome: Progressing Towards Goal

## 2022-08-23 NOTE — PROGRESS NOTES
Problem: Patient Education: Go to Patient Education Activity  Goal: Patient/Family Education  Outcome: Resolved/Met     Problem:  Delivery: Day of Delivery  Goal: Off Pathway (Use only if patient is Off Pathway)  Outcome: Resolved/Met     Problem:  Delivery: Postpartum Day 1  Goal: Off Pathway (Use only if patient is Off Pathway)  Outcome: Resolved/Met     Problem:  Delivery: Postpartum Day 2  Goal: Off Pathway (Use only if patient is Off Pathway)  Outcome: Resolved/Met  Goal: Activity/Safety  Outcome: Resolved/Met  Goal: Consults, if ordered  Outcome: Resolved/Met  Goal: Nutrition/Diet  Outcome: Resolved/Met  Goal: Discharge Planning  Outcome: Resolved/Met  Goal: Medications  Outcome: Resolved/Met  Goal: Treatments/Interventions/Procedures  Outcome: Resolved/Met  Goal: Psychosocial  Outcome: Resolved/Met  Goal: *Vital signs within defined limits  Outcome: Resolved/Met  Goal: *Labs within defined limits  Outcome: Resolved/Met  Goal: *Hemodynamically stable  Outcome: Resolved/Met  Goal: *Optimal pain control at patient's stated goal  Outcome: Resolved/Met  Goal: *Participates in infant care  Outcome: Resolved/Met  Goal: *Demonstrates progressive activity  Outcome: Resolved/Met  Goal: *Appropriate parent-infant bonding  Outcome: Resolved/Met  Goal: *Tolerating diet  Outcome: Resolved/Met     Problem: Falls - Risk of  Goal: *Absence of Falls  Description: Document Naya Fall Risk and appropriate interventions in the flowsheet.   Outcome: Resolved/Met  Note: Fall Risk Interventions:            Medication Interventions: Teach patient to arise slowly    Elimination Interventions: Call light in reach              Problem: Patient Education: Go to Patient Education Activity  Goal: Patient/Family Education  Outcome: Resolved/Met

## 2022-09-02 ENCOUNTER — OFFICE VISIT (OUTPATIENT)
Dept: OBGYN CLINIC | Age: 27
End: 2022-09-02
Payer: COMMERCIAL

## 2022-09-02 VITALS
DIASTOLIC BLOOD PRESSURE: 76 MMHG | HEIGHT: 64 IN | SYSTOLIC BLOOD PRESSURE: 138 MMHG | BODY MASS INDEX: 50.02 KG/M2 | WEIGHT: 293 LBS

## 2022-09-02 DIAGNOSIS — O11.9 PREECLAMPSIA COMPLICATING HYPERTENSION: ICD-10-CM

## 2022-09-02 DIAGNOSIS — I10 PRIMARY HYPERTENSION: ICD-10-CM

## 2022-09-02 PROBLEM — O16.9 HYPERTENSION AFFECTING PREGNANCY: Status: RESOLVED | Noted: 2022-08-15 | Resolved: 2022-09-02

## 2022-09-02 PROBLEM — Z98.891 H/O CESAREAN SECTION: Status: ACTIVE | Noted: 2022-09-02

## 2022-09-02 PROBLEM — Z34.90 PREGNANT: Status: RESOLVED | Noted: 2022-05-20 | Resolved: 2022-09-02

## 2022-09-02 PROBLEM — O10.919 PRE-EXISTING HYPERTENSION DURING PREGNANCY: Status: RESOLVED | Noted: 2022-02-21 | Resolved: 2022-09-02

## 2022-09-02 PROBLEM — Z34.90 PREGNANCY: Status: RESOLVED | Noted: 2022-08-18 | Resolved: 2022-09-02

## 2022-09-02 PROCEDURE — 0503F POSTPARTUM CARE VISIT: CPT | Performed by: OBSTETRICS & GYNECOLOGY

## 2022-09-02 NOTE — PROGRESS NOTES
Katharine Bell is a , 27 y.o. female   Patient's last menstrual period was 2021 (lmp unknown). She presents for her post-partum/post op incision check    She is having no significant problems. Menstrual status:  Cycles are  Postpartum . Flow: postpartum. She does not have dysmenorrhea. Medical conditions:  Since her last annual GYN exam about one year ago, she has not the following changes in her health history: none. Mammogram History:    MANAV Results (most recent):  No results found for this or any previous visit. DEXA Results (most recent):  No results found for this or any previous visit. Past Medical History:   Diagnosis Date    Cystic fibrosis carrier 2022    Essential hypertension      Past Surgical History:   Procedure Laterality Date    HX  SECTION  2022    HX OOPHORECTOMY Right     HX OTHER SURGICAL         Prior to Admission medications    Medication Sig Start Date End Date Taking? Authorizing Provider   ibuprofen (MOTRIN) 800 mg tablet Take 1 Tablet by mouth every eight (8) hours. 22  Yes Massiel Gant MD   furosemide (LASIX) 40 mg tablet Take 1 tablet PO every other day as needed for Lower extremity edema 22  Yes Massiel Gant MD   labetaloL (NORMODYNE) 300 mg tablet Take 2 tablets PO TID 22  Yes Massiel Gant MD   NIFEdipine ER (PROCARDIA XL) 60 mg ER tablet Take 1 Tablet by mouth daily. 22  Yes Massiel Gant MD   aspirin delayed-release 81 mg tablet Take 1 Tablet by mouth daily. 22  Yes Myrna Parr MD   prenatal vit-calcium-iron-fa (Prenatal Plus, calcium carb,) 27 mg iron- 1 mg tab Take 1 Tablet by mouth daily. Yes Provider, Historical       No Known Allergies       Tobacco History:  reports that she has never smoked. She has never used smokeless tobacco.  Alcohol use:  reports no history of alcohol use. Drug use:  reports no history of drug use.     Family Medical/Cancer History: History reviewed. No pertinent family history. Review of Systems   Constitutional:  Negative for chills, fever, malaise/fatigue and weight loss. HENT:  Negative for congestion, ear pain, sinus pain and tinnitus. Eyes:  Negative for blurred vision and double vision. Respiratory:  Negative for cough, shortness of breath and wheezing. Cardiovascular:  Negative for chest pain and palpitations. Gastrointestinal:  Negative for abdominal pain, blood in stool, constipation, diarrhea, heartburn, nausea and vomiting. Genitourinary:  Negative for dysuria, flank pain, frequency, hematuria and urgency. Musculoskeletal:  Negative for joint pain and myalgias. Skin:  Negative for itching and rash. Neurological:  Negative for dizziness, weakness and headaches. Psychiatric/Behavioral:  Negative for depression, memory loss and suicidal ideas. The patient is not nervous/anxious and does not have insomnia. Physical Exam  Constitutional:       Appearance: Normal appearance. HENT:      Head: Normocephalic and atraumatic. Abdominal:      General: Abdomen is flat. Palpations: Abdomen is soft. Neurological:      Mental Status: She is alert. Psychiatric:         Mood and Affect: Mood normal.         Behavior: Behavior normal.         Thought Content: Thought content normal.       Visit Vitals  /76 (BP 1 Location: Left upper arm, BP Patient Position: Sitting, BP Cuff Size: Large adult)   Ht 5' 4\" (1.626 m)   Wt 309 lb (140.2 kg)   LMP 12/03/2021 (LMP Unknown)   Breastfeeding Yes   BMI 53.04 kg/m²         Assessment: Diagnoses and all orders for this visit:    1. Routine postpartum follow-up    2. BMI 50.0-59.9, adult ([address]    3. Preeclampsia complicating hypertension    4. Primary hypertension      Plan: Questions addressed, Instructed to keep incision clean and dry, cont.  BP meds  Counseled re: diet, exercise, healthy lifestyle  Return for Annual      Follow-up and Dispositions    Return in about 5 weeks (around 10/7/2022) for Post Partum.

## 2022-09-02 NOTE — PROGRESS NOTES
Chief Complaint   Patient presents with    Post OP Follow Up     22 .  Incision check today     Visit Vitals  /76 (BP 1 Location: Left upper arm, BP Patient Position: Sitting, BP Cuff Size: Large adult)   Ht 5' 4\" (1.626 m)   Wt 309 lb (140.2 kg)   LMP 2021 (LMP Unknown)   Breastfeeding Yes   BMI 53.04 kg/m²

## 2022-09-25 NOTE — ANESTHESIA POSTPROCEDURE EVALUATION
Procedure(s):   SECTION.     spinal, general - backup    Anesthesia Post Evaluation      Multimodal analgesia: multimodal analgesia used between 6 hours prior to anesthesia start to PACU discharge  Patient location during evaluation: PACU  Patient participation: complete - patient participated  Level of consciousness: awake  Pain score: 0  Pain management: adequate  Airway patency: patent  Anesthetic complications: no  Cardiovascular status: acceptable  Respiratory status: acceptable  Hydration status: acceptable  Post anesthesia nausea and vomiting:  controlled  Final Post Anesthesia Temperature Assessment:  Normothermia (36.0-37.5 degrees C)      INITIAL Post-op Vital signs:   Vitals Value Taken Time   /76 22 1056   Temp 36.4 °C (97.6 °F) 22 09   Pulse 107 22 0933   Resp 18 22 09   SpO2 98 % 22

## 2022-10-07 ENCOUNTER — OFFICE VISIT (OUTPATIENT)
Dept: OBGYN CLINIC | Age: 27
End: 2022-10-07
Payer: COMMERCIAL

## 2022-10-07 VITALS
DIASTOLIC BLOOD PRESSURE: 72 MMHG | WEIGHT: 293 LBS | BODY MASS INDEX: 50.02 KG/M2 | HEIGHT: 64 IN | SYSTOLIC BLOOD PRESSURE: 122 MMHG

## 2022-10-07 DIAGNOSIS — N94.89 SUPPRESSION OF MENSTRUATION: ICD-10-CM

## 2022-10-07 PROCEDURE — 0503F POSTPARTUM CARE VISIT: CPT | Performed by: OBSTETRICS & GYNECOLOGY

## 2022-10-07 RX ORDER — DROSPIRENONE AND ETHINYL ESTRADIOL 0.02-3(28)
1 KIT ORAL DAILY
Qty: 3 DOSE PACK | Refills: 1 | Status: SHIPPED | OUTPATIENT
Start: 2022-10-07

## 2022-10-07 RX ORDER — NYSTATIN 100000 [USP'U]/G
POWDER TOPICAL 4 TIMES DAILY
Qty: 60 G | Refills: 2 | Status: SHIPPED | OUTPATIENT
Start: 2022-10-07

## 2022-10-07 NOTE — PROGRESS NOTES
Myrna Alvarado is a , 32 y.o. female   No LMP recorded (lmp unknown). She presents for her post-partum    She is having no significant problems. Menstrual status:  Cycles are  postpartum . Flow: postpartum. She does not have dysmenorrhea. Medical conditions:  Since her last annual GYN exam about one year ago, she has not the following changes in her health history: none. Mammogram History:    MANAV Results (most recent):  No results found for this or any previous visit. DEXA Results (most recent):  No results found for this or any previous visit. Past Medical History:   Diagnosis Date    Cystic fibrosis carrier 2022    Essential hypertension      Past Surgical History:   Procedure Laterality Date    HX  SECTION  2022    HX OOPHORECTOMY Right     HX OTHER SURGICAL         Prior to Admission medications    Medication Sig Start Date End Date Taking? Authorizing Provider   drospirenone-ethinyl estradioL (JOHN) 3-0.02 mg tab Take 1 Tablet by mouth daily. 10/7/22  Yes Joanne Camacho MD   nystatin (MYCOSTATIN) powder Apply  to affected area four (4) times daily. 10/7/22  Yes Joanne Camacho MD   NIFEdipine ER (PROCARDIA XL) 60 mg ER tablet Take 1 Tablet by mouth daily. 22  Yes Joanne Camacho MD   aspirin delayed-release 81 mg tablet Take 1 Tablet by mouth daily. 22  Yes Vee Marrero MD   ibuprofen (MOTRIN) 800 mg tablet Take 1 Tablet by mouth every eight (8) hours. 22   Joanne Camacho MD   furosemide (LASIX) 40 mg tablet Take 1 tablet PO every other day as needed for Lower extremity edema 22   Joanne Camacho MD   labetaloL (NORMODYNE) 300 mg tablet Take 2 tablets PO TID 22   Joanne Camacho MD       No Known Allergies       Tobacco History:  reports that she has never smoked. She has never used smokeless tobacco.  Alcohol use:  reports no history of alcohol use. Drug use:  reports no history of drug use.     Family Medical/Cancer History: History reviewed. No pertinent family history. Review of Systems   Constitutional:  Negative for chills, fever, malaise/fatigue and weight loss. HENT:  Negative for congestion, ear pain, sinus pain and tinnitus. Eyes:  Negative for blurred vision and double vision. Respiratory:  Negative for cough, shortness of breath and wheezing. Cardiovascular:  Negative for chest pain and palpitations. Gastrointestinal:  Negative for abdominal pain, blood in stool, constipation, diarrhea, heartburn, nausea and vomiting. Genitourinary:  Negative for dysuria, flank pain, frequency, hematuria and urgency. Musculoskeletal:  Negative for joint pain and myalgias. Skin:  Negative for itching and rash. Neurological:  Negative for dizziness, weakness and headaches. Psychiatric/Behavioral:  Negative for depression, memory loss and suicidal ideas. The patient is not nervous/anxious and does not have insomnia. Physical Exam  Constitutional:       Appearance: Normal appearance. HENT:      Head: Normocephalic and atraumatic. Abdominal:      General: Abdomen is flat. Palpations: Abdomen is soft. Neurological:      Mental Status: She is alert. Psychiatric:         Mood and Affect: Mood normal.         Behavior: Behavior normal.         Thought Content: Thought content normal.        Visit Vitals  /72 (BP 1 Location: Left upper arm, BP Patient Position: Sitting, BP Cuff Size: Large adult)   Ht 5' 4\" (1.626 m)   Wt 315 lb 6 oz (143.1 kg)   LMP  (LMP Unknown)   Breastfeeding No   BMI 54.13 kg/m²         Assessment: Diagnoses and all orders for this visit:    1. Routine postpartum follow-up    2. Suppression of menstruation    Other orders  -     drospirenone-ethinyl estradioL (JOHN) 3-0.02 mg tab; Take 1 Tablet by mouth daily. -     nystatin (MYCOSTATIN) powder; Apply  to affected area four (4) times daily.   Bottle feeding    Plan: Questions addressed  Counseled re: diet, exercise, healthy lifestyle  Return for Annual      Follow-up and Dispositions    Return in about 4 months (around 2/7/2023) for Annual.

## 2022-10-07 NOTE — PROGRESS NOTES
Chief Complaint   Patient presents with    Post-Partum Care     Pap due 2-18-23 & she is aware     Visit Vitals  /72 (BP 1 Location: Left upper arm, BP Patient Position: Sitting, BP Cuff Size: Large adult)   Ht 5' 4\" (1.626 m)   Wt 315 lb 6 oz (143.1 kg)   LMP  (LMP Unknown)   Breastfeeding No   BMI 54.13 kg/m²

## 2022-11-21 RX ORDER — NIFEDIPINE 60 MG/1
TABLET, EXTENDED RELEASE ORAL
Qty: 30 TABLET | Refills: 2 | Status: SHIPPED | OUTPATIENT
Start: 2022-11-21

## 2022-11-21 RX ORDER — LABETALOL 300 MG/1
TABLET, FILM COATED ORAL
Qty: 180 TABLET | Refills: 1 | Status: SHIPPED | OUTPATIENT
Start: 2022-11-21

## 2022-12-20 RX ORDER — DROSPIRENONE AND ETHINYL ESTRADIOL 0.02-3(28)
1 KIT ORAL DAILY
Qty: 3 DOSE PACK | Refills: 1 | Status: SHIPPED | OUTPATIENT
Start: 2022-12-20

## 2023-03-12 RX ORDER — LABETALOL 300 MG/1
TABLET, FILM COATED ORAL
Qty: 180 TABLET | Refills: 1 | Status: SHIPPED | OUTPATIENT
Start: 2023-03-12

## 2023-03-20 ENCOUNTER — OFFICE VISIT (OUTPATIENT)
Dept: OBGYN CLINIC | Age: 28
End: 2023-03-20
Payer: MEDICAID

## 2023-03-20 VITALS
DIASTOLIC BLOOD PRESSURE: 72 MMHG | BODY MASS INDEX: 50.02 KG/M2 | HEIGHT: 64 IN | SYSTOLIC BLOOD PRESSURE: 120 MMHG | WEIGHT: 293 LBS

## 2023-03-20 DIAGNOSIS — Z11.3 SCREENING EXAMINATION FOR VENEREAL DISEASE: ICD-10-CM

## 2023-03-20 DIAGNOSIS — N89.8 VAGINAL IRRITATION: ICD-10-CM

## 2023-03-20 DIAGNOSIS — Z01.419 ROUTINE GYNECOLOGICAL EXAMINATION: ICD-10-CM

## 2023-03-20 DIAGNOSIS — N94.89 SUPPRESSION OF MENSTRUATION: ICD-10-CM

## 2023-03-20 DIAGNOSIS — Z12.4 SCREENING FOR MALIGNANT NEOPLASM OF CERVIX: Primary | ICD-10-CM

## 2023-03-20 PROCEDURE — 3074F SYST BP LT 130 MM HG: CPT | Performed by: OBSTETRICS & GYNECOLOGY

## 2023-03-20 PROCEDURE — 99395 PREV VISIT EST AGE 18-39: CPT | Performed by: OBSTETRICS & GYNECOLOGY

## 2023-03-20 PROCEDURE — 3078F DIAST BP <80 MM HG: CPT | Performed by: OBSTETRICS & GYNECOLOGY

## 2023-03-20 RX ORDER — FLUCONAZOLE 150 MG/1
TABLET ORAL
Qty: 2 TABLET | Refills: 0 | Status: SHIPPED | OUTPATIENT
Start: 2023-03-20

## 2023-03-20 RX ORDER — DROSPIRENONE AND ETHINYL ESTRADIOL 0.02-3(28)
1 KIT ORAL DAILY
Qty: 3 DOSE PACK | Refills: 3 | Status: SHIPPED | OUTPATIENT
Start: 2023-03-20

## 2023-03-20 NOTE — PROGRESS NOTES
Vijay Tariq is a , 32 y.o. female   Patient's last menstrual period was 2023 (exact date). She presents for her annual    She is having no significant problems. Except for some vaginal irritation      Menstrual status:  Cycles are very light to nonexistent due to contraceptive hormones. Flow: light. She does not have dysmenorrhea. Medical conditions:  Since her last annual GYN exam about one year ago, she has not the following changes in her health history: none. Mammogram History:    MANAV Results (most recent):  No results found for this or any previous visit. DEXA Results (most recent):  No results found for this or any previous visit. Past Medical History:   Diagnosis Date    Cystic fibrosis carrier 2022    Essential hypertension     Vaginal irritation 2023     Past Surgical History:   Procedure Laterality Date    HX  SECTION  2022    HX OOPHORECTOMY Right     HX OTHER SURGICAL         Prior to Admission medications    Medication Sig Start Date End Date Taking? Authorizing Provider   drospirenone-ethinyl estradioL (JOHN) 3-0.02 mg tab Take 1 Tablet by mouth daily. 3/20/23  Yes Bee Rosado MD   labetaloL (NORMODYNE) 300 mg tablet take 2 tablets by mouth three times a day 3/12/23  Yes Bee Rosado MD   NIFEdipine ER (PROCARDIA XL) 60 mg ER tablet take 1 tablet by mouth once daily 22  Yes Bee Rosado MD   nystatin (MYCOSTATIN) powder Apply  to affected area four (4) times daily. 10/7/22  Yes Bee Rosado MD   aspirin delayed-release 81 mg tablet Take 1 Tablet by mouth daily. 22  Yes Dexter Sevilla MD       No Known Allergies       Tobacco History:  reports that she has never smoked. She has never used smokeless tobacco.  Alcohol use:  reports no history of alcohol use. Drug use:  reports no history of drug use. Family Medical/Cancer History: History reviewed. No pertinent family history.        Review of Systems   Constitutional:  Negative for chills, fever, malaise/fatigue and weight loss. HENT:  Negative for congestion, ear pain, sinus pain and tinnitus. Eyes:  Negative for blurred vision and double vision. Respiratory:  Negative for cough, shortness of breath and wheezing. Cardiovascular:  Negative for chest pain and palpitations. Gastrointestinal:  Negative for abdominal pain, blood in stool, constipation, diarrhea, heartburn, nausea and vomiting. Genitourinary:  Negative for dysuria, flank pain, frequency, hematuria and urgency. Musculoskeletal:  Negative for joint pain and myalgias. Skin:  Negative for itching and rash. Neurological:  Negative for dizziness, weakness and headaches. Psychiatric/Behavioral:  Negative for depression, memory loss and suicidal ideas. The patient is not nervous/anxious and does not have insomnia. Physical Exam  Constitutional:       Appearance: Normal appearance. HENT:      Head: Normocephalic and atraumatic. Cardiovascular:      Rate and Rhythm: Normal rate. Heart sounds: Normal heart sounds. Pulmonary:      Effort: Pulmonary effort is normal.      Breath sounds: Normal breath sounds. Chest:   Breasts:     Right: Normal.      Left: Normal.   Abdominal:      General: Abdomen is flat. Palpations: Abdomen is soft. Genitourinary:     General: Normal vulva. Vagina: Vaginal discharge present. Cervix: Normal.      Uterus: Normal.       Adnexa: Right adnexa normal and left adnexa normal.      Rectum: Normal.      Comments: PAP Obtained  Neurological:      Mental Status: She is alert. Psychiatric:         Mood and Affect: Mood normal.         Behavior: Behavior normal.         Thought Content:  Thought content normal.        Visit Vitals  /72 (BP 1 Location: Left upper arm, BP Patient Position: Sitting, BP Cuff Size: Large adult)   Ht 5' 4\" (1.626 m)   Wt 326 lb 4 oz (148 kg)   LMP 03/01/2023 (Exact Date)   Breastfeeding No BMI 56.00 kg/m²         Assessment: Diagnoses and all orders for this visit:    1. Screening for malignant neoplasm of cervix  -     PAP IG, RFX APTIMA HPV ASCUS ([id number])    2. Vaginal irritation  -     NUSWAB VG PLUS+MYCOPLASMAS,FLORES    3. Screening examination for venereal disease  -     NUSWAB VG PLUS+MYCOPLASMAS,FLORES    4. Routine gynecological examination  -     PAP IG, RFX APTIMA HPV ASCUS ([id number])    5. Suppression of menstruation    6. BMI 50.0-59.9, adult ([facility] [address]    Other orders  -     drospirenone-ethinyl estradioL (JOHN) 3-0.02 mg tab; Take 1 Tablet by mouth daily. -     fluconazole (DIFLUCAN) 150 mg tablet; Take 1 tablet now and again in 2 days. FDA advises cautious prescribing of oral fluconazole in pregnancy.         Plan: Questions addressed  Counseled re: diet, exercise, healthy lifestyle  Return for Annual        Follow-up and Dispositions    Return for 1 yr annual.

## 2023-03-20 NOTE — PROGRESS NOTES
Chief Complaint   Patient presents with    Annual Exam     POSTCOITAL VAGINAL IRRITATION     Visit Vitals  /72 (BP 1 Location: Left upper arm, BP Patient Position: Sitting, BP Cuff Size: Large adult)   Ht 5' 4\" (1.626 m)   Wt 326 lb 4 oz (148 kg)   LMP 03/01/2023 (Exact Date)   Breastfeeding No   BMI 56.00 kg/m²

## 2023-03-22 LAB
CYTOLOGIST CVX/VAG CYTO: NORMAL
CYTOLOGY CVX/VAG DOC CYTO: NORMAL
CYTOLOGY CVX/VAG DOC THIN PREP: NORMAL
DX ICD CODE: NORMAL
LABCORP, 190119: NORMAL
Lab: NORMAL
OTHER STN SPEC: NORMAL
STAT OF ADQ CVX/VAG CYTO-IMP: NORMAL

## 2023-03-24 LAB
A VAGINAE DNA VAG QL NAA+PROBE: ABNORMAL SCORE
BVAB2 DNA VAG QL NAA+PROBE: ABNORMAL SCORE
C ALBICANS DNA VAG QL NAA+PROBE: POSITIVE
C GLABRATA DNA VAG QL NAA+PROBE: NEGATIVE
C TRACH DNA VAG QL NAA+PROBE: NEGATIVE
M GENITALIUM DNA SPEC QL NAA+PROBE: NEGATIVE
M HOMINIS DNA SPEC QL NAA+PROBE: NEGATIVE
MEGA1 DNA VAG QL NAA+PROBE: ABNORMAL SCORE
N GONORRHOEA DNA VAG QL NAA+PROBE: NEGATIVE
T VAGINALIS DNA VAG QL NAA+PROBE: POSITIVE
UREAPLASMA DNA SPEC QL NAA+PROBE: POSITIVE

## 2023-03-24 RX ORDER — AZITHROMYCIN 500 MG/1
1000 TABLET, FILM COATED ORAL DAILY
Qty: 2 TABLET | Refills: 0 | Status: SHIPPED | OUTPATIENT
Start: 2023-03-24 | End: 2023-03-25

## 2023-03-24 RX ORDER — FLUCONAZOLE 150 MG/1
150 TABLET ORAL DAILY
Qty: 1 TABLET | Refills: 0 | Status: SHIPPED | OUTPATIENT
Start: 2023-03-24 | End: 2023-03-25

## 2023-03-24 RX ORDER — METRONIDAZOLE 500 MG/1
TABLET ORAL
Qty: 4 TABLET | Refills: 0 | Status: SHIPPED | OUTPATIENT
Start: 2023-03-24

## 2023-03-24 NOTE — PROGRESS NOTES
Nu-swab: Trich, Candida, Ureaplasma  MyChart message sent  Scripts sent    Needs JES[facility] appt in 6 weeks

## 2023-05-02 ENCOUNTER — OFFICE VISIT (OUTPATIENT)
Dept: OBGYN CLINIC | Age: 28
End: 2023-05-02
Payer: MEDICAID

## 2023-05-02 VITALS — WEIGHT: 293 LBS | HEIGHT: 64 IN | BODY MASS INDEX: 50.02 KG/M2

## 2023-05-02 DIAGNOSIS — Z20.2 VENEREAL DISEASE CONTACT: Primary | ICD-10-CM

## 2023-05-02 PROCEDURE — 99213 OFFICE O/P EST LOW 20 MIN: CPT | Performed by: OBSTETRICS & GYNECOLOGY

## 2023-05-10 RX ORDER — AZITHROMYCIN 1 G
1 PACKET (EA) ORAL ONCE
Qty: 1 EACH | Refills: 0 | Status: SHIPPED | OUTPATIENT
Start: 2023-05-10 | End: 2023-05-10

## 2023-06-02 RX ORDER — NIFEDIPINE 60 MG/1
TABLET, EXTENDED RELEASE ORAL
Qty: 30 TABLET | OUTPATIENT
Start: 2023-06-02

## 2024-01-23 ENCOUNTER — TELEPHONE (OUTPATIENT)
Age: 29
End: 2024-01-23

## 2024-01-23 NOTE — TELEPHONE ENCOUNTER
1/23/2024 @12:22pm-Called [phone] and L/M on VM to have patient contact the office at [phone] regarding message sent via portal to schedule an annual exam and birth control f/up.ww

## 2024-03-22 ENCOUNTER — OFFICE VISIT (OUTPATIENT)
Age: 29
End: 2024-03-22
Payer: MEDICAID

## 2024-03-22 VITALS — BODY MASS INDEX: 50.02 KG/M2 | WEIGHT: 293 LBS | HEIGHT: 64 IN

## 2024-03-22 DIAGNOSIS — Z01.419 GYNECOLOGIC EXAM NORMAL: ICD-10-CM

## 2024-03-22 DIAGNOSIS — Z11.3 SCREENING EXAMINATION FOR VENEREAL DISEASE: ICD-10-CM

## 2024-03-22 DIAGNOSIS — N94.89 SUPPRESSION OF MENSTRUATION: ICD-10-CM

## 2024-03-22 DIAGNOSIS — Z12.4 PAP SMEAR FOR CERVICAL CANCER SCREENING: Primary | ICD-10-CM

## 2024-03-22 PROBLEM — O11.9 PREECLAMPSIA COMPLICATING HYPERTENSION: Status: RESOLVED | Noted: 2022-08-19 | Resolved: 2024-03-22

## 2024-03-22 PROCEDURE — 99395 PREV VISIT EST AGE 18-39: CPT | Performed by: OBSTETRICS & GYNECOLOGY

## 2024-03-22 RX ORDER — NORETHINDRONE ACETATE AND ETHINYL ESTRADIOL 1.5-30(21)
1 KIT ORAL DAILY
Qty: 3 PACKET | Refills: 3 | Status: SHIPPED | OUTPATIENT
Start: 2024-03-22

## 2024-03-22 ASSESSMENT — ENCOUNTER SYMPTOMS
RESPIRATORY NEGATIVE: 1
GASTROINTESTINAL NEGATIVE: 1

## 2024-03-22 NOTE — PROGRESS NOTES
Chief Complaint   Patient presents with    Annual Exam     Ht 1.626 m (5' 4\")   Wt (!) 155.7 kg (343 lb 4 oz)   LMP 03/20/2024 (Exact Date)   BMI 58.92 kg/m²     
drug use.    Family Medical/Cancer History:   Family History   Family history unknown: Yes        Review of Systems   Constitutional: Negative.    Respiratory: Negative.     Cardiovascular: Negative.    Gastrointestinal: Negative.    Genitourinary: Negative.    Musculoskeletal: Negative.    Neurological: Negative.    Psychiatric/Behavioral: Negative.           Ht 1.626 m (5' 4\")   Wt (!) 155.7 kg (343 lb 4 oz)   LMP 03/20/2024 (Exact Date)   BMI 58.92 kg/m²     Physical Exam  Constitutional:       Appearance: Normal appearance.   Cardiovascular:      Rate and Rhythm: Normal rate and regular rhythm.   Pulmonary:      Effort: Pulmonary effort is normal.      Breath sounds: Normal breath sounds.   Chest:   Breasts:     Right: Normal.      Left: Normal.   Abdominal:      General: Abdomen is flat.      Palpations: Abdomen is soft.   Genitourinary:     General: Normal vulva.      Vagina: Normal.      Cervix: Normal.      Uterus: Normal.       Adnexa: Right adnexa normal and left adnexa normal.      Rectum: Normal.      Comments: Pap obtained  Musculoskeletal:         General: Normal range of motion.   Neurological:      General: No focal deficit present.      Mental Status: She is alert and oriented to person, place, and time. Mental status is at baseline.   Psychiatric:         Mood and Affect: Mood normal.         Behavior: Behavior normal.         Thought Content: Thought content normal.         Judgment: Judgment normal.              Assessment: Bre was seen today for annual exam.    Diagnoses and all orders for this visit:    Pap smear for cervical cancer screening  -     PAP IG, CT-NG, rfx Aptima HPV ASCUS (199320) ([facility])    Screening examination for venereal disease  -     PAP IG, CT-NG, rfx Aptima HPV ASCUS (199320) ([facility])    Gynecologic exam normal    BMI 50.0-59.9, adult (HCC)    Suppression of menstruation  -     norethindrone-ethinyl estradiol-iron (LOESTRIN FE 1.5/30) 1.5-30 MG-MCG tablet; Take 1

## 2024-03-27 LAB
., LABCORP: NORMAL
C TRACH RRNA CVX QL NAA+PROBE: NEGATIVE
CYTOLOGIST CVX/VAG CYTO: NORMAL
CYTOLOGY CVX/VAG DOC CYTO: NORMAL
CYTOLOGY CVX/VAG DOC THIN PREP: NORMAL
DX ICD CODE: NORMAL
Lab: NORMAL
N GONORRHOEA RRNA CVX QL NAA+PROBE: NEGATIVE
OTHER STN SPEC: NORMAL
STAT OF ADQ CVX/VAG CYTO-IMP: NORMAL

## (undated) DEVICE — SUT MONOCRYL PLUS UD 4-0 --

## (undated) DEVICE — TRAY URIN CATH PED 16FR BLLN 5CC INDWL STR TIP INF CTRL

## (undated) DEVICE — SUTURE VCRL SZ 0 L36IN ABSRB UD L40MM CT 1/2 CIR TAPERPOINT J958H

## (undated) DEVICE — APPLICATOR SCRB 26ML TEAL STRL -- CHLORAPREP 26ML

## (undated) DEVICE — DRAPE C-SECTION W/WINDOW --

## (undated) DEVICE — BAG,SPONGE COUNTER,CLEAR,50/BX,5BX/CS: Brand: MEDLINE

## (undated) DEVICE — STERILE POLYISOPRENE POWDER-FREE SURGICAL GLOVES WITH EMOLLIENT COATING: Brand: PROTEXIS

## (undated) DEVICE — FORCEPS TISS L6.25IN S STL ADAIR STYL

## (undated) DEVICE — SUTURE PLN GUT SZ 2-0 L27IN ABSRB YELLOWISH TAN L70MM XLH 53T

## (undated) DEVICE — REM POLYHESIVE ADULT PATIENT RETURN ELECTRODE: Brand: VALLEYLAB

## (undated) DEVICE — SOL IRR SOD CL 0.9% 1000ML BTL --

## (undated) DEVICE — HANDLE SURG LIGHT OB

## (undated) DEVICE — C-SECTION: Brand: MEDLINE INDUSTRIES, INC.

## (undated) DEVICE — SUT VCRL + 2-0 36IN CT1 UD --